# Patient Record
Sex: MALE | Race: ASIAN | Employment: OTHER | URBAN - METROPOLITAN AREA
[De-identification: names, ages, dates, MRNs, and addresses within clinical notes are randomized per-mention and may not be internally consistent; named-entity substitution may affect disease eponyms.]

---

## 2017-03-29 ENCOUNTER — OFFICE VISIT (OUTPATIENT)
Dept: INTERNAL MEDICINE CLINIC | Facility: CLINIC | Age: 62
End: 2017-03-29

## 2017-03-29 VITALS
DIASTOLIC BLOOD PRESSURE: 84 MMHG | WEIGHT: 144 LBS | TEMPERATURE: 98 F | BODY MASS INDEX: 25.84 KG/M2 | HEART RATE: 96 BPM | HEIGHT: 62.5 IN | RESPIRATION RATE: 18 BRPM | SYSTOLIC BLOOD PRESSURE: 128 MMHG

## 2017-03-29 DIAGNOSIS — E11.69 TYPE 2 DIABETES MELLITUS WITH OTHER SPECIFIED COMPLICATION, WITHOUT LONG-TERM CURRENT USE OF INSULIN (HCC): ICD-10-CM

## 2017-03-29 DIAGNOSIS — Z00.00 ROUTINE GENERAL MEDICAL EXAMINATION AT A HEALTH CARE FACILITY: Primary | ICD-10-CM

## 2017-03-29 DIAGNOSIS — Z12.5 SCREENING FOR PROSTATE CANCER: ICD-10-CM

## 2017-03-29 PROCEDURE — 99396 PREV VISIT EST AGE 40-64: CPT | Performed by: INTERNAL MEDICINE

## 2017-03-30 NOTE — PROGRESS NOTES
HPI:    Patient ID: Andrews Morin is a 59year old male.     HPI    Physical exam    Denies complaint  Last labs 2015      /84 mmHg  Pulse 96  Temp(Src) 98 °F (36.7 °C) (Oral)  Resp 18  Ht 5' 2.5\" (1.588 m)  Wt 144 lb (65.318 kg)  BMI 25.90 kg/m2 problem. Skin: Negative for rash. Neurological: Negative for syncope, weakness, light-headedness and headaches. Hematological: Negative for adenopathy. Does not bruise/bleed easily.    Psychiatric/Behavioral: Negative for behavioral problems and agita normal. Pupils are equal, round, and reactive to light. Right eye exhibits no discharge. Left eye exhibits no discharge. No scleral icterus. Neck: Neck supple. No thyromegaly present. Cardiovascular: Normal rate, regular rhythm and normal heart sounds. Differential  Comp Metabolic Panel (14)  Lipid Panel  Assay, Thyroid Stim Hormone  Free T4, (Free Thyroxine)  Hemoglobin A1C  Protein,Total,Urine, Random [E]  Urinalysis, Routine  PSA (Screening) [E]    Meds This Visit:  No prescriptions requested or order

## 2017-04-01 ENCOUNTER — APPOINTMENT (OUTPATIENT)
Dept: LAB | Age: 62
End: 2017-04-01
Attending: INTERNAL MEDICINE
Payer: COMMERCIAL

## 2017-04-01 DIAGNOSIS — Z00.00 ROUTINE GENERAL MEDICAL EXAMINATION AT A HEALTH CARE FACILITY: ICD-10-CM

## 2017-04-01 DIAGNOSIS — Z12.5 SCREENING FOR PROSTATE CANCER: ICD-10-CM

## 2017-04-01 PROCEDURE — 81001 URINALYSIS AUTO W/SCOPE: CPT

## 2017-04-01 PROCEDURE — 84443 ASSAY THYROID STIM HORMONE: CPT

## 2017-04-01 PROCEDURE — 85027 COMPLETE CBC AUTOMATED: CPT

## 2017-04-01 PROCEDURE — 36415 COLL VENOUS BLD VENIPUNCTURE: CPT

## 2017-04-01 PROCEDURE — 80061 LIPID PANEL: CPT

## 2017-04-01 PROCEDURE — 83036 HEMOGLOBIN GLYCOSYLATED A1C: CPT

## 2017-04-01 PROCEDURE — 84439 ASSAY OF FREE THYROXINE: CPT

## 2017-04-01 PROCEDURE — 80053 COMPREHEN METABOLIC PANEL: CPT

## 2017-04-01 PROCEDURE — 84156 ASSAY OF PROTEIN URINE: CPT

## 2017-04-04 ENCOUNTER — TELEPHONE (OUTPATIENT)
Dept: FAMILY MEDICINE CLINIC | Facility: CLINIC | Age: 62
End: 2017-04-04

## 2017-04-08 RX ORDER — LOSARTAN POTASSIUM 100 MG/1
TABLET ORAL
Qty: 90 TABLET | Refills: 1 | Status: SHIPPED | OUTPATIENT
Start: 2017-04-08 | End: 2017-10-28

## 2017-04-08 RX ORDER — ATORVASTATIN CALCIUM 20 MG/1
TABLET, FILM COATED ORAL
Qty: 90 TABLET | Refills: 1 | Status: SHIPPED | OUTPATIENT
Start: 2017-04-08 | End: 2017-12-01

## 2017-04-08 NOTE — TELEPHONE ENCOUNTER
I called pt's home, spoke to wife. Pt already spoke to nurse about results and has scheduled an appt.

## 2017-04-19 ENCOUNTER — OFFICE VISIT (OUTPATIENT)
Dept: INTERNAL MEDICINE CLINIC | Facility: CLINIC | Age: 62
End: 2017-04-19

## 2017-04-19 VITALS
SYSTOLIC BLOOD PRESSURE: 144 MMHG | WEIGHT: 143.63 LBS | HEART RATE: 91 BPM | DIASTOLIC BLOOD PRESSURE: 88 MMHG | HEIGHT: 62.5 IN | BODY MASS INDEX: 25.77 KG/M2

## 2017-04-19 DIAGNOSIS — E78.5 HYPERLIPIDEMIA, UNSPECIFIED HYPERLIPIDEMIA TYPE: ICD-10-CM

## 2017-04-19 DIAGNOSIS — I10 ESSENTIAL HYPERTENSION: ICD-10-CM

## 2017-04-19 DIAGNOSIS — E11.69 TYPE 2 DIABETES MELLITUS WITH OTHER SPECIFIED COMPLICATION (HCC): Primary | ICD-10-CM

## 2017-04-19 PROCEDURE — 99214 OFFICE O/P EST MOD 30 MIN: CPT | Performed by: INTERNAL MEDICINE

## 2017-04-19 PROCEDURE — 99212 OFFICE O/P EST SF 10 MIN: CPT | Performed by: INTERNAL MEDICINE

## 2017-05-01 NOTE — PROGRESS NOTES
HPI:    Patient ID: Jose Juan Martin is a 59year old male.     HPI    HTN  Long standing history of hypertension  More than a year kerry  Status::::: controlled:::::::::::::::::::::::::::::y   sympotms  :        Headache no  dizziness        no pressure and sore throat. Eyes: Negative for pain, discharge and redness. Respiratory: Negative for cough, chest tightness, shortness of breath and wheezing. Cardiovascular: Negative for chest pain, palpitations and leg swelling.    Gastrointestinal history      Social History:   Smoking Status: Never Smoker                      Smokeless Status: Never Used                        Alcohol Use: No                 Comment: (Beer) 3 beers occasionally       PHYSICAL EXAM:    Physical Exam   Constitutional BUN/CREA RATIO      10.0-20.0 22.3 (H)   CALCULATED OSMOLALITY      275-295 mOsm/kg 291   GFR, Non-      >=60 >60   GFR, -American      >=60 >60   URINE-COLOR      Yellow Yellow   APPEARANCE      Clear Clear   SPECIFIC GRAVITY TREATMENT PLAN AND GOAL DISCUSSED WITH PATEINT:  Hemoglobin A1C goal is 6.5 percent  Blood glucose monitoring  AM goal 80 to 120  Blood Pressure Goal  Is less than 130/80  Take  Cholesterol medication regularly at night  Eye exam with opthalmology every ye

## 2017-10-31 RX ORDER — LOSARTAN POTASSIUM 100 MG/1
TABLET ORAL
Qty: 90 TABLET | Refills: 1 | Status: SHIPPED | OUTPATIENT
Start: 2017-10-31 | End: 2018-04-09

## 2017-12-01 RX ORDER — ATORVASTATIN CALCIUM 20 MG/1
TABLET, FILM COATED ORAL
Qty: 90 TABLET | Refills: 1 | Status: SHIPPED | OUTPATIENT
Start: 2017-12-01 | End: 2018-05-21

## 2018-04-09 RX ORDER — LOSARTAN POTASSIUM 100 MG/1
TABLET ORAL
Qty: 90 TABLET | Refills: 0 | Status: SHIPPED | OUTPATIENT
Start: 2018-04-09 | End: 2018-05-21

## 2018-05-21 ENCOUNTER — OFFICE VISIT (OUTPATIENT)
Dept: INTERNAL MEDICINE CLINIC | Facility: CLINIC | Age: 63
End: 2018-05-21

## 2018-05-21 VITALS
BODY MASS INDEX: 26.81 KG/M2 | DIASTOLIC BLOOD PRESSURE: 93 MMHG | SYSTOLIC BLOOD PRESSURE: 161 MMHG | TEMPERATURE: 99 F | HEART RATE: 88 BPM | HEIGHT: 61 IN | WEIGHT: 142 LBS

## 2018-05-21 DIAGNOSIS — E78.5 HYPERLIPIDEMIA, UNSPECIFIED HYPERLIPIDEMIA TYPE: ICD-10-CM

## 2018-05-21 DIAGNOSIS — N20.0 KIDNEY STONES: ICD-10-CM

## 2018-05-21 DIAGNOSIS — Z12.11 SCREENING FOR COLON CANCER: ICD-10-CM

## 2018-05-21 DIAGNOSIS — E11.69 TYPE 2 DIABETES MELLITUS WITH OTHER SPECIFIED COMPLICATION, WITHOUT LONG-TERM CURRENT USE OF INSULIN (HCC): ICD-10-CM

## 2018-05-21 DIAGNOSIS — Z12.5 SCREENING FOR PROSTATE CANCER: ICD-10-CM

## 2018-05-21 DIAGNOSIS — Z00.00 ROUTINE GENERAL MEDICAL EXAMINATION AT A HEALTH CARE FACILITY: Primary | ICD-10-CM

## 2018-05-21 DIAGNOSIS — I70.0 ATHEROSCLEROSIS OF ABDOMINAL AORTA (HCC): ICD-10-CM

## 2018-05-21 DIAGNOSIS — I10 ESSENTIAL HYPERTENSION: ICD-10-CM

## 2018-05-21 PROCEDURE — 99396 PREV VISIT EST AGE 40-64: CPT | Performed by: INTERNAL MEDICINE

## 2018-05-21 RX ORDER — ATORVASTATIN CALCIUM 20 MG/1
TABLET, FILM COATED ORAL
Qty: 90 TABLET | Refills: 1 | Status: SHIPPED | OUTPATIENT
Start: 2018-05-21 | End: 2020-01-22

## 2018-05-21 RX ORDER — LOSARTAN POTASSIUM 100 MG/1
TABLET ORAL
Qty: 90 TABLET | Refills: 1 | Status: SHIPPED | OUTPATIENT
Start: 2018-05-21 | End: 2020-01-22

## 2018-05-21 NOTE — PROGRESS NOTES
HPI:    Patient ID: Veronica Abbott is a 57year old male.     HPI    Physical exam      HTN  Long standing history of hypertension     sympotms  :        Headache no  dizziness        no                             Blurred vision no  palpitaionsSyncope no DAILY Disp: 90 tablet Rfl: 0   METFORMIN  MG Oral Tab TAKE ONE TABLET BY MOUTH IN THE MORNING AND ONE IN THE EVENING WITH MEALS Disp: 60 tablet Rfl: 3   ATORVASTATIN 20 MG Oral Tab TAKE ONE TABLET BY MOUTH ONCE DAILY Disp: 90 tablet Rfl: 1   aspirin He has no wheezes. He has no rales. He exhibits no tenderness. Abdominal: Soft. Bowel sounds are normal. He exhibits no distension and no mass. There is no tenderness. Musculoskeletal: He exhibits no edema or tenderness.    Lymphadenopathy:     He has n testing reviewed. Dietary and lifestyle change discussed. Modification of risk for CAD discussed. Patient voiced understanding and agrees with current plan and management.   \      Meds This Visit:  Pending Prescriptions Disp Refills    atorvastatin 20 M

## 2018-05-24 ENCOUNTER — TELEPHONE (OUTPATIENT)
Dept: INTERNAL MEDICINE CLINIC | Facility: CLINIC | Age: 63
End: 2018-05-24

## 2018-05-24 ENCOUNTER — APPOINTMENT (OUTPATIENT)
Dept: LAB | Age: 63
End: 2018-05-24
Attending: INTERNAL MEDICINE
Payer: COMMERCIAL

## 2018-05-24 DIAGNOSIS — Z00.00 ROUTINE GENERAL MEDICAL EXAMINATION AT A HEALTH CARE FACILITY: ICD-10-CM

## 2018-05-24 DIAGNOSIS — Z12.5 SCREENING FOR PROSTATE CANCER: ICD-10-CM

## 2018-05-24 DIAGNOSIS — Z12.11 SCREENING FOR COLON CANCER: ICD-10-CM

## 2018-05-24 PROCEDURE — 84439 ASSAY OF FREE THYROXINE: CPT

## 2018-05-24 PROCEDURE — 36415 COLL VENOUS BLD VENIPUNCTURE: CPT

## 2018-05-24 PROCEDURE — 81015 MICROSCOPIC EXAM OF URINE: CPT

## 2018-05-24 PROCEDURE — 80053 COMPREHEN METABOLIC PANEL: CPT

## 2018-05-24 PROCEDURE — 83036 HEMOGLOBIN GLYCOSYLATED A1C: CPT

## 2018-05-24 PROCEDURE — 84443 ASSAY THYROID STIM HORMONE: CPT

## 2018-05-24 PROCEDURE — 80061 LIPID PANEL: CPT

## 2018-05-24 PROCEDURE — 82274 ASSAY TEST FOR BLOOD FECAL: CPT

## 2018-05-24 PROCEDURE — 85027 COMPLETE CBC AUTOMATED: CPT

## 2018-05-24 PROCEDURE — 84156 ASSAY OF PROTEIN URINE: CPT

## 2018-05-24 NOTE — TELEPHONE ENCOUNTER
PSA elevated 4.2   Follow up with urology  Referral [facility] an appt with DR Mik Tracy [phone]   Other labs are normal

## 2018-05-25 NOTE — TELEPHONE ENCOUNTER
Spoke with patient's spouse, on hippa (verified name and ), reviewed information, she verbalized understanding and agrees with plan. Phone number for Dr. Shin [place] office was given.

## 2018-07-10 ENCOUNTER — NURSE TRIAGE (OUTPATIENT)
Dept: OTHER | Age: 63
End: 2018-07-10

## 2018-07-10 NOTE — TELEPHONE ENCOUNTER
Please reply to pool: EM RN TRIAGE  Action Requested: Summary for Provider     []  Critical Lab, Recommendations Needed  [x] Need Additional Advice  []   FYI    [x]   Need Orders  [] Need Medications Sent to Pharmacy  []  Other     SUMMARY: Advised to go t

## 2018-07-10 NOTE — TELEPHONE ENCOUNTER
I am fully booked  My medicare annual   2 of them bet 1 to 2 pm   Are not here yet they went to the wrong office  Elderly form Juan GARVEY approval taken already

## 2018-07-10 NOTE — TELEPHONE ENCOUNTER
Monitor Add SDS this Thursday if needed  If any other symptoms dizziness  More bruising   follouwp    Note pt playing tennis    If short of breath  Dizzy  Chest pain syncope    ER  And let me know

## 2018-07-12 NOTE — TELEPHONE ENCOUNTER
Scheduled appt with Dr Matthew Francis 7/16/18 @ 1:00pm [place] office for evaluation of left foot bruising. Wife John Masters stated pt is at work. No sob, chest pain, dizziness, bruising is slowing dissapating.

## 2018-08-07 ENCOUNTER — OFFICE VISIT (OUTPATIENT)
Dept: SURGERY | Facility: CLINIC | Age: 63
End: 2018-08-07
Payer: MEDICARE

## 2018-08-07 ENCOUNTER — TELEPHONE (OUTPATIENT)
Dept: SURGERY | Facility: CLINIC | Age: 63
End: 2018-08-07

## 2018-08-07 ENCOUNTER — LAB ENCOUNTER (OUTPATIENT)
Dept: LAB | Facility: HOSPITAL | Age: 63
End: 2018-08-07
Attending: UROLOGY
Payer: MEDICARE

## 2018-08-07 VITALS
WEIGHT: 146 LBS | BODY MASS INDEX: 26.87 KG/M2 | DIASTOLIC BLOOD PRESSURE: 80 MMHG | HEART RATE: 80 BPM | SYSTOLIC BLOOD PRESSURE: 142 MMHG | HEIGHT: 62 IN | RESPIRATION RATE: 16 BRPM | TEMPERATURE: 99 F

## 2018-08-07 DIAGNOSIS — R97.20 ELEVATED PSA, LESS THAN 10 NG/ML: Primary | ICD-10-CM

## 2018-08-07 DIAGNOSIS — R97.20 ELEVATED PSA, LESS THAN 10 NG/ML: ICD-10-CM

## 2018-08-07 LAB
PSA FREE MFR SERPL: 21 %
PSA FREE SERPL-MCNC: 0.64 NG/ML
PSA SERPL-MCNC: 3 NG/ML (ref 0–4)

## 2018-08-07 PROCEDURE — 36415 COLL VENOUS BLD VENIPUNCTURE: CPT

## 2018-08-07 PROCEDURE — 84154 ASSAY OF PSA FREE: CPT

## 2018-08-07 PROCEDURE — 84153 ASSAY OF PSA TOTAL: CPT

## 2018-08-07 PROCEDURE — G0463 HOSPITAL OUTPT CLINIC VISIT: HCPCS | Performed by: UROLOGY

## 2018-08-07 PROCEDURE — 99204 OFFICE O/P NEW MOD 45 MIN: CPT | Performed by: UROLOGY

## 2018-08-07 NOTE — TELEPHONE ENCOUNTER
Called to discuss PSA results from today. Level down to 3 ng/dL today from 4.2 two months ago. Discussed that this is reassuring and we would forego him having a biopsy. He will return to see me in 6 months with another PSA level.

## 2018-08-07 NOTE — PROGRESS NOTES
[facility], [facility] Urology  Initial Office Consultation    HPI:   Brenda Jauregui is a 70year old male here today for evaluation of an elevated screening PSA of 4.2 ng/mL. The test was ordered by his PCP.   He was referred to urology after the results came ba Allergies: Chicken; Shrimp    REVIEW OF SYSTEMS:  Review of Systems   Constitutional: Negative for appetite change, chills, fatigue, fever and unexpected weight change. HENT: Negative for hearing loss and sore throat.     Eyes: Negative for pain an 4/17/2015   PSA      0.0 - 4.0 ng/mL 3.0 4.2 (H) 2.6 2.9   FREE PSA      ng/mL       % FREE PSA      % 21      PSA, FREE      ng/mL 0.640        Component      Latest Ref Rng & Units 6/29/2013 4/5/2011   PSA      0.0 - 4.0 ng/mL 2.3 1.8   FREE PSA      ng/ lower than 4.2, or normal then I would forego undergoing a biopsy and just have him return in 6 months with another PSA level. He asked appropriate questions, all of which were answered to his satisfaction. PLAN:  - Repeat PSA level obtained today.

## 2018-08-09 ENCOUNTER — TELEPHONE (OUTPATIENT)
Dept: CASE MANAGEMENT | Age: 63
End: 2018-08-09

## 2018-10-01 ENCOUNTER — TELEPHONE (OUTPATIENT)
Dept: CASE MANAGEMENT | Age: 63
End: 2018-10-01

## 2018-10-03 ENCOUNTER — TELEPHONE (OUTPATIENT)
Dept: INTERNAL MEDICINE CLINIC | Facility: CLINIC | Age: 63
End: 2018-10-03

## 2018-10-03 NOTE — TELEPHONE ENCOUNTER
Tiffani/Dr. Shaheed Chen's office called in stating that they have attempted to schedule with Pt multiple times without success. Please be aware.

## 2018-11-12 ENCOUNTER — TELEPHONE (OUTPATIENT)
Dept: CASE MANAGEMENT | Age: 63
End: 2018-11-12

## 2019-03-14 ENCOUNTER — TELEPHONE (OUTPATIENT)
Dept: SURGERY | Facility: CLINIC | Age: 64
End: 2019-03-14

## 2019-06-24 ENCOUNTER — TELEPHONE (OUTPATIENT)
Dept: CASE MANAGEMENT | Age: 64
End: 2019-06-24

## 2019-08-09 ENCOUNTER — TELEPHONE (OUTPATIENT)
Dept: CASE MANAGEMENT | Age: 64
End: 2019-08-09

## 2019-09-27 ENCOUNTER — TELEPHONE (OUTPATIENT)
Dept: CASE MANAGEMENT | Age: 64
End: 2019-09-27

## 2020-01-06 ENCOUNTER — TELEPHONE (OUTPATIENT)
Dept: INTERNAL MEDICINE CLINIC | Facility: CLINIC | Age: 65
End: 2020-01-06

## 2020-01-22 ENCOUNTER — OFFICE VISIT (OUTPATIENT)
Dept: INTERNAL MEDICINE CLINIC | Facility: CLINIC | Age: 65
End: 2020-01-22
Payer: MEDICARE

## 2020-01-22 VITALS
BODY MASS INDEX: 24.99 KG/M2 | SYSTOLIC BLOOD PRESSURE: 138 MMHG | TEMPERATURE: 98 F | HEIGHT: 62 IN | HEART RATE: 106 BPM | DIASTOLIC BLOOD PRESSURE: 88 MMHG | WEIGHT: 135.81 LBS

## 2020-01-22 DIAGNOSIS — E11.69 TYPE 2 DIABETES MELLITUS WITH OTHER SPECIFIED COMPLICATION, WITHOUT LONG-TERM CURRENT USE OF INSULIN (HCC): ICD-10-CM

## 2020-01-22 DIAGNOSIS — E78.5 HYPERLIPIDEMIA, UNSPECIFIED HYPERLIPIDEMIA TYPE: ICD-10-CM

## 2020-01-22 DIAGNOSIS — I10 ESSENTIAL HYPERTENSION: ICD-10-CM

## 2020-01-22 DIAGNOSIS — Z12.11 SCREENING FOR COLON CANCER: ICD-10-CM

## 2020-01-22 DIAGNOSIS — Z00.00 ENCOUNTER FOR ANNUAL HEALTH EXAMINATION: ICD-10-CM

## 2020-01-22 DIAGNOSIS — Z00.00 ROUTINE GENERAL MEDICAL EXAMINATION AT A HEALTH CARE FACILITY: Primary | ICD-10-CM

## 2020-01-22 DIAGNOSIS — I70.0 ATHEROSCLEROSIS OF ABDOMINAL AORTA (HCC): ICD-10-CM

## 2020-01-22 DIAGNOSIS — Z12.5 SCREENING FOR PROSTATE CANCER: ICD-10-CM

## 2020-01-22 PROCEDURE — 96160 PT-FOCUSED HLTH RISK ASSMT: CPT | Performed by: INTERNAL MEDICINE

## 2020-01-22 PROCEDURE — 90662 IIV NO PRSV INCREASED AG IM: CPT | Performed by: INTERNAL MEDICINE

## 2020-01-22 PROCEDURE — 99397 PER PM REEVAL EST PAT 65+ YR: CPT | Performed by: INTERNAL MEDICINE

## 2020-01-22 PROCEDURE — G0438 PPPS, INITIAL VISIT: HCPCS | Performed by: INTERNAL MEDICINE

## 2020-01-22 PROCEDURE — G0009 ADMIN PNEUMOCOCCAL VACCINE: HCPCS | Performed by: INTERNAL MEDICINE

## 2020-01-22 PROCEDURE — 90732 PPSV23 VACC 2 YRS+ SUBQ/IM: CPT | Performed by: INTERNAL MEDICINE

## 2020-01-22 PROCEDURE — G0008 ADMIN INFLUENZA VIRUS VAC: HCPCS | Performed by: INTERNAL MEDICINE

## 2020-01-22 RX ORDER — ATORVASTATIN CALCIUM 20 MG/1
TABLET, FILM COATED ORAL
Qty: 90 TABLET | Refills: 3 | Status: SHIPPED | OUTPATIENT
Start: 2020-01-22 | End: 2020-11-04

## 2020-01-22 RX ORDER — LANCETS 28 GAUGE
EACH MISCELLANEOUS
Qty: 100 EACH | Refills: 3 | Status: SHIPPED | OUTPATIENT
Start: 2020-01-22 | End: 2021-03-08

## 2020-01-22 RX ORDER — LOSARTAN POTASSIUM 100 MG/1
TABLET ORAL
Qty: 90 TABLET | Refills: 1 | Status: SHIPPED | OUTPATIENT
Start: 2020-01-22 | End: 2020-06-26

## 2020-01-22 RX ORDER — BLOOD-GLUCOSE METER
KIT MISCELLANEOUS
Qty: 1 DEVICE | Refills: 0 | Status: SHIPPED | OUTPATIENT
Start: 2020-01-22

## 2020-01-22 RX ORDER — ZOLPIDEM TARTRATE 5 MG/1
5 TABLET ORAL NIGHTLY PRN
Qty: 30 TABLET | Refills: 0 | Status: SHIPPED | OUTPATIENT
Start: 2020-01-22 | End: 2020-04-01

## 2020-01-22 RX ORDER — BLOOD-GLUCOSE METER
KIT MISCELLANEOUS
Qty: 100 STRIP | Refills: 3 | Status: SHIPPED | OUTPATIENT
Start: 2020-01-22 | End: 2021-03-08

## 2020-01-24 ENCOUNTER — APPOINTMENT (OUTPATIENT)
Dept: LAB | Age: 65
End: 2020-01-24
Attending: INTERNAL MEDICINE
Payer: MEDICARE

## 2020-01-24 DIAGNOSIS — Z12.11 SCREENING FOR COLON CANCER: ICD-10-CM

## 2020-01-24 DIAGNOSIS — E11.69 TYPE 2 DIABETES MELLITUS WITH OTHER SPECIFIED COMPLICATION, WITHOUT LONG-TERM CURRENT USE OF INSULIN (HCC): ICD-10-CM

## 2020-01-24 DIAGNOSIS — E78.5 HYPERLIPIDEMIA, UNSPECIFIED HYPERLIPIDEMIA TYPE: ICD-10-CM

## 2020-01-24 LAB
ALBUMIN SERPL-MCNC: 3.8 G/DL (ref 3.4–5)
ALBUMIN/GLOB SERPL: 1 {RATIO} (ref 1–2)
ALP LIVER SERPL-CCNC: 64 U/L (ref 45–117)
ALT SERPL-CCNC: 24 U/L (ref 16–61)
ANION GAP SERPL CALC-SCNC: 4 MMOL/L (ref 0–18)
AST SERPL-CCNC: 13 U/L (ref 15–37)
BACTERIA UR QL AUTO: NEGATIVE /HPF
BILIRUB SERPL-MCNC: 0.6 MG/DL (ref 0.1–2)
BUN BLD-MCNC: 14 MG/DL (ref 7–18)
BUN/CREAT SERPL: 13.2 (ref 10–20)
CALCIUM BLD-MCNC: 9.8 MG/DL (ref 8.5–10.1)
CHLORIDE SERPL-SCNC: 100 MMOL/L (ref 98–112)
CHOLEST SMN-MCNC: 151 MG/DL (ref ?–200)
CO2 SERPL-SCNC: 31 MMOL/L (ref 21–32)
COMPLEXED PSA SERPL-MCNC: 2.28 NG/ML (ref ?–4)
CREAT BLD-MCNC: 1.06 MG/DL (ref 0.7–1.3)
DEPRECATED RDW RBC AUTO: 40.5 FL (ref 35.1–46.3)
ERYTHROCYTE [DISTWIDTH] IN BLOOD BY AUTOMATED COUNT: 12.8 % (ref 11–15)
EST. AVERAGE GLUCOSE BLD GHB EST-MCNC: 364 MG/DL (ref 68–126)
GLOBULIN PLAS-MCNC: 3.7 G/DL (ref 2.8–4.4)
GLUCOSE BLD-MCNC: 229 MG/DL (ref 70–99)
HBA1C MFR BLD HPLC: 14.3 % (ref ?–5.7)
HCT VFR BLD AUTO: 48.9 % (ref 39–53)
HDLC SERPL-MCNC: 46 MG/DL (ref 40–59)
HGB BLD-MCNC: 16.4 G/DL (ref 13–17.5)
LDLC SERPL CALC-MCNC: 89 MG/DL (ref ?–100)
M PROTEIN MFR SERPL ELPH: 7.5 G/DL (ref 6.4–8.2)
MCH RBC QN AUTO: 29.2 PG (ref 26–34)
MCHC RBC AUTO-ENTMCNC: 33.5 G/DL (ref 31–37)
MCV RBC AUTO: 87.2 FL (ref 80–100)
NONHDLC SERPL-MCNC: 105 MG/DL (ref ?–130)
OSMOLALITY SERPL CALC.SUM OF ELEC: 288 MOSM/KG (ref 275–295)
PATIENT FASTING Y/N/NP: YES
PATIENT FASTING Y/N/NP: YES
PLATELET # BLD AUTO: 157 10(3)UL (ref 150–450)
POTASSIUM SERPL-SCNC: 4.3 MMOL/L (ref 3.5–5.1)
PROT UR-MCNC: 29.9 MG/DL
RBC # BLD AUTO: 5.61 X10(6)UL (ref 3.8–5.8)
RBC #/AREA URNS AUTO: <1 /HPF
SODIUM SERPL-SCNC: 135 MMOL/L (ref 136–145)
T4 FREE SERPL-MCNC: 1.2 NG/DL (ref 0.8–1.7)
TRIGL SERPL-MCNC: 81 MG/DL (ref 30–149)
TSI SER-ACNC: 0.3 MIU/ML (ref 0.36–3.74)
VLDLC SERPL CALC-MCNC: 16 MG/DL (ref 0–30)
WBC # BLD AUTO: 7.4 X10(3) UL (ref 4–11)
WBC #/AREA URNS AUTO: <1 /HPF

## 2020-01-24 PROCEDURE — 84443 ASSAY THYROID STIM HORMONE: CPT

## 2020-01-24 PROCEDURE — 80053 COMPREHEN METABOLIC PANEL: CPT

## 2020-01-24 PROCEDURE — 36415 COLL VENOUS BLD VENIPUNCTURE: CPT

## 2020-01-24 PROCEDURE — 81015 MICROSCOPIC EXAM OF URINE: CPT

## 2020-01-24 PROCEDURE — 85027 COMPLETE CBC AUTOMATED: CPT

## 2020-01-24 PROCEDURE — 80061 LIPID PANEL: CPT

## 2020-01-24 PROCEDURE — 84156 ASSAY OF PROTEIN URINE: CPT

## 2020-01-24 PROCEDURE — 84439 ASSAY OF FREE THYROXINE: CPT

## 2020-01-24 PROCEDURE — 83036 HEMOGLOBIN GLYCOSYLATED A1C: CPT

## 2020-02-03 NOTE — PROGRESS NOTES
HPI:   Arabella Antoine is a 75year old male who presents for a Medicare Subsequent Annual Wellness visit (Pt already had Initial Annual Wellness).       His last annual assessment has been over 1 year: Annual Physical due on 05/21/2019         Fall/Risk As Chemistry Labs:   Lab Results   Component Value Date    AST 13 (L) 01/24/2020    ALT 24 01/24/2020    CA 9.8 01/24/2020    ALB 3.8 01/24/2020    TSH 0.300 (L) 01/24/2020    CREATSERUM 1.06 01/24/2020     (H) 01/24/2020        CBC  (most recent labs) Questionnaire  I have a problem hearing over the telephone:  No I have trouble following the conversations when two or more people are talking at the same time:  No   I have trouble understanding things on the TV:  No I have to strain to understand convers Regular rhythm, normal heart sounds and intact distal pulses. No murmur heard. Edema not present. Pulmonary/Chest: Effort normal and breath sounds normal. No respiratory distress. He has no wheezes. He has no rales.    Abdominal: Bowel sounds are norm REFLEX CULTURE,         OPHTHALMOLOGY - EXTERNAL        COMLIANCE  WITH FOLLOW UP ADVISED  EYE EXAM YEARLY    (I70.0) Atherosclerosis of abdominal aorta (HCC)  Plan: ASYMPTOMATIC    (Z12.11) Screening for colon cancer  Plan: PSA SCREEN, OP REFERRAL TO [facility] Physical Exam only, or if medically necessary Electrocardiogram date    Colorectal Cancer Screening      Colonoscopy Screen every 10 years Colonoscopy due on 07/22/2003 Update Health Maintenance if applicable    Flex Sigmoidoscopy Screen every 10 years No Potassium  Annually Potassium (mmol/L)   Date Value   01/24/2020 4.3     POTASSIUM (P) (mmol/L)   Date Value   04/17/2015 4.5    No flowsheet data found. Creatinine  Annually Creatinine (mg/dL)   Date Value   01/24/2020 1.06    No flowsheet data found.

## 2020-02-03 NOTE — PATIENT INSTRUCTIONS
Howard Schmid's SCREENING SCHEDULE   Tests on this list are recommended by your physician but may not be covered, or covered at this frequency, by your insurer. Please check with your insurance carrier before scheduling to verify coverage.     MARILYNN more often if abnormal Colonoscopy due on 07/22/2003 Update Health Maintenance if applicable    Flex Sigmoidoscopy Screen  Covered every 5 years No results found for this or any previous visit. No flowsheet data found.      Fecal Occult Blood   Covered Iesha Not covered by Medicare Part B) No orders found for this or any previous visit.  This may be covered with your prescription benefits, but Medicare does not cover unless Medically needed    Zoster (Not covered by Medicare Part B) No orders found for this or

## 2020-04-01 RX ORDER — ZOLPIDEM TARTRATE 5 MG/1
TABLET ORAL
Qty: 30 TABLET | Refills: 0 | Status: SHIPPED | OUTPATIENT
Start: 2020-04-01 | End: 2020-05-26

## 2020-05-06 ENCOUNTER — TELEPHONE (OUTPATIENT)
Dept: INTERNAL MEDICINE CLINIC | Facility: CLINIC | Age: 65
End: 2020-05-06

## 2020-05-06 NOTE — TELEPHONE ENCOUNTER
Current Outpatient Medications:   •  metFORMIN HCl  MG Oral Tablet 24 Hr, Take 2 tablets (1,000 mg total) by mouth 2 (two) times daily with meals. , Disp: 360 tablet, Rfl: 3

## 2020-05-26 ENCOUNTER — TELEPHONE (OUTPATIENT)
Dept: INTERNAL MEDICINE CLINIC | Facility: CLINIC | Age: 65
End: 2020-05-26

## 2020-06-24 ENCOUNTER — TELEPHONE (OUTPATIENT)
Dept: INTERNAL MEDICINE CLINIC | Facility: CLINIC | Age: 65
End: 2020-06-24

## 2020-06-24 NOTE — TELEPHONE ENCOUNTER
Patient's wife, Timothy De La Cruz, called, because she was advised that patient's prescription for metformin has been recalled. Please advise.     metFORMIN HCl  MG Oral Tablet 24 Hr

## 2020-06-25 NOTE — TELEPHONE ENCOUNTER
Spoke to Pt will contact local pharmacy around his area and see if same lot #'s for Metformin are recalled . Per Pt believes all Metformin has been recalled as they have same  .  Pt will contact our office once obtains a pharmacy that does not h

## 2020-06-26 RX ORDER — LOSARTAN POTASSIUM 100 MG/1
TABLET ORAL
Qty: 90 TABLET | Refills: 3 | Status: SHIPPED | OUTPATIENT
Start: 2020-06-26 | End: 2020-11-04

## 2020-08-04 RX ORDER — ZOLPIDEM TARTRATE 5 MG/1
5 TABLET ORAL NIGHTLY PRN
Qty: 30 TABLET | Refills: 0 | Status: SHIPPED | OUTPATIENT
Start: 2020-08-04 | End: 2020-08-08

## 2020-08-08 RX ORDER — ZOLPIDEM TARTRATE 5 MG/1
TABLET ORAL
Qty: 30 TABLET | Refills: 0 | Status: SHIPPED
Start: 2020-08-08 | End: 2020-10-10

## 2020-08-14 ENCOUNTER — TELEPHONE (OUTPATIENT)
Dept: INTERNAL MEDICINE CLINIC | Facility: CLINIC | Age: 65
End: 2020-08-14

## 2020-08-14 RX ORDER — ZOLPIDEM TARTRATE 5 MG/1
5 TABLET ORAL NIGHTLY PRN
Qty: 30 TABLET | Refills: 1 | Status: CANCELLED | OUTPATIENT
Start: 2020-08-14

## 2020-08-14 NOTE — TELEPHONE ENCOUNTER
Ahmeek/ Pharmacy Technician of [facility] is requesting a script for patient's medication ZOLPIDEM 5 MG Oral Tab. April Dinero states patient would like for this medication to be sent to [facility].

## 2020-08-14 NOTE — TELEPHONE ENCOUNTER
Routed to Dr Nishant Hook for advise, thanks. Requested Prescriptions     Pending Prescriptions Disp Refills   • zolpidem 5 MG Oral Tab 30 tablet 1     Sig: Take 1 tablet (5 mg total) by mouth nightly as needed for Sleep.        Last Office Visit with PCP: 1/

## 2020-08-26 ENCOUNTER — TELEPHONE (OUTPATIENT)
Dept: INTERNAL MEDICINE CLINIC | Facility: CLINIC | Age: 65
End: 2020-08-26

## 2020-08-26 ENCOUNTER — NURSE TRIAGE (OUTPATIENT)
Dept: INTERNAL MEDICINE CLINIC | Facility: CLINIC | Age: 65
End: 2020-08-26

## 2020-08-26 ENCOUNTER — HOSPITAL ENCOUNTER (OUTPATIENT)
Dept: CT IMAGING | Facility: HOSPITAL | Age: 65
Discharge: HOME | End: 2020-08-26
Attending: INTERNAL MEDICINE
Payer: MEDICARE

## 2020-08-26 ENCOUNTER — OFFICE VISIT (OUTPATIENT)
Dept: INTERNAL MEDICINE CLINIC | Facility: CLINIC | Age: 65
End: 2020-08-26
Payer: MEDICARE

## 2020-08-26 VITALS
HEART RATE: 111 BPM | DIASTOLIC BLOOD PRESSURE: 79 MMHG | SYSTOLIC BLOOD PRESSURE: 182 MMHG | TEMPERATURE: 98 F | WEIGHT: 146 LBS | BODY MASS INDEX: 26.87 KG/M2 | HEIGHT: 62 IN

## 2020-08-26 DIAGNOSIS — R31.9 HEMATURIA, UNSPECIFIED TYPE: ICD-10-CM

## 2020-08-26 DIAGNOSIS — R10.31 RLQ ABDOMINAL PAIN: ICD-10-CM

## 2020-08-26 DIAGNOSIS — R31.9 HEMATURIA, UNSPECIFIED TYPE: Primary | ICD-10-CM

## 2020-08-26 LAB
MULTISTIX LOT#: NORMAL NUMERIC
PH, URINE: 5 (ref 4.5–8)
SPECIFIC GRAVITY: 1.01 (ref 1–1.03)
UROBILINOGEN,SEMI-QN: 0.2 MG/DL (ref 0–1.9)

## 2020-08-26 PROCEDURE — 99214 OFFICE O/P EST MOD 30 MIN: CPT | Performed by: INTERNAL MEDICINE

## 2020-08-26 PROCEDURE — 3077F SYST BP >= 140 MM HG: CPT | Performed by: INTERNAL MEDICINE

## 2020-08-26 PROCEDURE — 3078F DIAST BP <80 MM HG: CPT | Performed by: INTERNAL MEDICINE

## 2020-08-26 PROCEDURE — 81002 URINALYSIS NONAUTO W/O SCOPE: CPT | Performed by: INTERNAL MEDICINE

## 2020-08-26 PROCEDURE — 74176 CT ABD & PELVIS W/O CONTRAST: CPT | Performed by: INTERNAL MEDICINE

## 2020-08-26 PROCEDURE — 3008F BODY MASS INDEX DOCD: CPT | Performed by: INTERNAL MEDICINE

## 2020-08-26 RX ORDER — TRAMADOL HYDROCHLORIDE 50 MG/1
50 TABLET ORAL EVERY 8 HOURS PRN
Qty: 30 TABLET | Refills: 1 | Status: SHIPPED | OUTPATIENT
Start: 2020-08-26

## 2020-08-26 NOTE — TELEPHONE ENCOUNTER
I checked back with pt to see if symptoms were worse (would send to ER). Wife states that the Advil has helped, although the pain does come and on. Advised appt with another provider. appt made with Dr. Bryanna Clark at 10:45.

## 2020-08-26 NOTE — TELEPHONE ENCOUNTER
Contacted patient's insurance [facility] to obtain PA for STAT CT ABDOMEN + PELVIS KIDNEYSTONE 2D RNDR (NO IV, NO ORAL) (CPT 57540). Prior Authorization required through [place]:   Phone: [phone]    Spoke to Eden Pruitt.  Provided her with patient in

## 2020-08-26 NOTE — PROGRESS NOTES
Patient ID: Sixto Bergman is a 77year old male. Patient presents with:  Abdominal Pain: Right side abdominal pain   Hematuria: Noticed hematuria in urine yesterday, and some today. HISTORY OF PRESENT ILLNESS:   HPI  Patient presents for above. Pain., Disp: 30 tablet, Rfl: 1  •  ZOLPIDEM 5 MG Oral Tab, TAKE 1 TABLET BY MOUTH NIGHTLY AS NEEDED FOR SLEEP, Disp: 30 tablet, Rfl: 0  •  losartan 100 MG Oral Tab, TAKE 1 TABLET DAILY (OFFICE VISIT REQUIRED PRIOR TO ANY ADDITIONAL REFILLS), Disp: 90 table Caodaism service: Not on file        Active member of club or organization: Not on file        Attends meetings of clubs or organizations: Not on file        Relationship status: Not on file      Intimate partner violence:        Fear of current or ex par Ref Range: Negative  Small   Ketones, UA Latest Ref Range: Negative mg/dL Neg   Blood Urine Latest Ref Range: Negative  Large   PH Urine Latest Ref Range: 4.5 - 8.0  5.0   Protein Urine Latest Ref Range: Negative/Trace mg/dL Small   Urobilinogen Urine Late

## 2020-08-26 NOTE — TELEPHONE ENCOUNTER
Action Requested: Summary for Provider     []  Critical Lab, Recommendations Needed  [] Need Additional Advice  []   FYI    []   Need Orders  [] Need Medications Sent to Pharmacy  []  Other     SUMMARY: pt's wife states that pt had hematuria yesterday angélica

## 2020-08-26 NOTE — PATIENT INSTRUCTIONS
Blood in Urine (Hematuria)   Blood in your urine is called hematuria. Most of the time, the cause is not serious. But you should never ignore blood in the urine.  Your healthcare provider can evaluate you to find the cause of the bleeding and treat it, if · Ultrasound of the kidney  · Kidney biopsy  Sima last reviewed this educational content on 6/1/2019  © 6551-6849 [employer]. [address] [facility], [address] [place] [address]. All rights reserved.  This information is not intended as a substitut · Each time you urinate, do so in a jar. Pour the urine from the jar through the strainer and into the toilet. Continue doing this until 24 hours after your pain stops. By then, if there was a kidney stone, it should pass from your bladder.  Some stones dis · Eat foods that contain phytates. These include wheat, rice, rye, barley, and beans. Phytates are substances that may lower your risk for any type of stone to form. · Eat more fruits and vegetables. Choose those that are high in potassium.   · Eat foods h · A dietitian or your healthcare provider can give you information about changes in your diet that will help prevent more kidney stones from forming.   Follow-up care  Follow up with your healthcare provider, or as advised, if the pain lasts more than 48 ho

## 2020-08-27 ENCOUNTER — MED REC SCAN ONLY (OUTPATIENT)
Dept: INTERNAL MEDICINE CLINIC | Facility: CLINIC | Age: 65
End: 2020-08-27

## 2020-10-10 RX ORDER — ZOLPIDEM TARTRATE 5 MG/1
TABLET ORAL
Qty: 30 TABLET | Refills: 0 | Status: SHIPPED | OUTPATIENT
Start: 2020-10-10 | End: 2020-11-04

## 2020-10-11 RX ORDER — ZOLPIDEM TARTRATE 5 MG/1
5 TABLET ORAL NIGHTLY PRN
Qty: 30 TABLET | Refills: 0 | OUTPATIENT
Start: 2020-10-11

## 2020-11-02 ENCOUNTER — TELEPHONE (OUTPATIENT)
Dept: INTERNAL MEDICINE CLINIC | Facility: CLINIC | Age: 65
End: 2020-11-02

## 2020-11-02 NOTE — TELEPHONE ENCOUNTER
Spouse, states that the patient has an appointment with Dr. Lopez Thakkar on Wednesday 11-4-20 and would like to do his blood work prior to his appointment. Please, call pt when the orders are in the system.

## 2020-11-04 ENCOUNTER — OFFICE VISIT (OUTPATIENT)
Dept: INTERNAL MEDICINE CLINIC | Facility: CLINIC | Age: 65
End: 2020-11-04
Payer: MEDICARE

## 2020-11-04 VITALS
DIASTOLIC BLOOD PRESSURE: 84 MMHG | SYSTOLIC BLOOD PRESSURE: 130 MMHG | HEART RATE: 108 BPM | WEIGHT: 145 LBS | HEIGHT: 62 IN | TEMPERATURE: 98 F | BODY MASS INDEX: 26.68 KG/M2

## 2020-11-04 DIAGNOSIS — E78.5 HYPERLIPIDEMIA, UNSPECIFIED HYPERLIPIDEMIA TYPE: ICD-10-CM

## 2020-11-04 DIAGNOSIS — I10 ESSENTIAL HYPERTENSION: ICD-10-CM

## 2020-11-04 DIAGNOSIS — Z12.11 COLON CANCER SCREENING: ICD-10-CM

## 2020-11-04 DIAGNOSIS — E11.69 TYPE 2 DIABETES MELLITUS WITH OTHER SPECIFIED COMPLICATION, WITHOUT LONG-TERM CURRENT USE OF INSULIN (HCC): Primary | ICD-10-CM

## 2020-11-04 PROCEDURE — G0008 ADMIN INFLUENZA VIRUS VAC: HCPCS | Performed by: INTERNAL MEDICINE

## 2020-11-04 PROCEDURE — 3075F SYST BP GE 130 - 139MM HG: CPT | Performed by: INTERNAL MEDICINE

## 2020-11-04 PROCEDURE — 3079F DIAST BP 80-89 MM HG: CPT | Performed by: INTERNAL MEDICINE

## 2020-11-04 PROCEDURE — 90662 IIV NO PRSV INCREASED AG IM: CPT | Performed by: INTERNAL MEDICINE

## 2020-11-04 PROCEDURE — 3008F BODY MASS INDEX DOCD: CPT | Performed by: INTERNAL MEDICINE

## 2020-11-04 PROCEDURE — 99214 OFFICE O/P EST MOD 30 MIN: CPT | Performed by: INTERNAL MEDICINE

## 2020-11-04 RX ORDER — ATORVASTATIN CALCIUM 20 MG/1
TABLET, FILM COATED ORAL
Qty: 90 TABLET | Refills: 3 | Status: SHIPPED | OUTPATIENT
Start: 2020-11-04 | End: 2021-03-01

## 2020-11-04 RX ORDER — LOSARTAN POTASSIUM 100 MG/1
TABLET ORAL
Qty: 90 TABLET | Refills: 3 | Status: SHIPPED | OUTPATIENT
Start: 2020-11-04 | End: 2022-01-13

## 2020-11-04 RX ORDER — ZOLPIDEM TARTRATE 5 MG/1
5 TABLET ORAL NIGHTLY PRN
Qty: 30 TABLET | Refills: 0 | Status: SHIPPED | OUTPATIENT
Start: 2020-11-04 | End: 2021-01-24

## 2020-11-04 RX ORDER — METFORMIN HYDROCHLORIDE 500 MG/1
1000 TABLET, EXTENDED RELEASE ORAL 2 TIMES DAILY WITH MEALS
Qty: 360 TABLET | Refills: 3 | Status: SHIPPED | OUTPATIENT
Start: 2020-11-04 | End: 2021-11-08

## 2020-11-04 NOTE — PROGRESS NOTES
HPI:    Patient ID: Alirio Pollock is a 77year old male. HPI    Diabetes  Follow upvisit. type 2 diabetes mellitus.    There are no hypoglycemic associated symptoms  Denies   blurred vision, chest pain,atigue,    foot paresthesiasfoot ulcerations, for adenopathy. Does not bruise/bleed easily. Psychiatric/Behavioral: Negative for agitation and behavioral problems.          Current Outpatient Medications   Medication Sig Dispense Refill   • zolpidem 5 MG Oral Tab Take 1 tablet (5 mg total) by mouth n tobacco: Former User    Alcohol use: No      Comment: (Beer) 3 beers occasionally    Drug use: No       PHYSICAL EXAM:    Physical Exam   Constitutional: He appears well-nourished. No distress. HENT:   Head: Normocephalic and atraumatic.    Right Ear: Ext (Oral)   Ht 5' 2\" (1.575 m)   Wt 145 lb (65.8 kg)   BMI 26.52 kg/m²   Controlled CPm  wthite coat HTN with dx of HTN    (Z12.11) Colon cancer screening  Plan: OP REFERRAL TO [facility] GI TELEPHONE COLON SCREEN,         OCCULT BLOOD, FECAL, IMMUNOASSAY        as

## 2020-11-05 ENCOUNTER — LAB ENCOUNTER (OUTPATIENT)
Dept: LAB | Age: 65
End: 2020-11-05
Attending: INTERNAL MEDICINE
Payer: MEDICARE

## 2020-11-05 DIAGNOSIS — E11.69 TYPE 2 DIABETES MELLITUS WITH OTHER SPECIFIED COMPLICATION, WITHOUT LONG-TERM CURRENT USE OF INSULIN (HCC): ICD-10-CM

## 2020-11-05 PROCEDURE — 83036 HEMOGLOBIN GLYCOSYLATED A1C: CPT

## 2020-11-05 PROCEDURE — 81001 URINALYSIS AUTO W/SCOPE: CPT | Performed by: INTERNAL MEDICINE

## 2020-11-05 PROCEDURE — 36415 COLL VENOUS BLD VENIPUNCTURE: CPT

## 2020-11-05 PROCEDURE — 84450 TRANSFERASE (AST) (SGOT): CPT

## 2020-11-05 PROCEDURE — 80048 BASIC METABOLIC PNL TOTAL CA: CPT

## 2020-11-05 PROCEDURE — 80061 LIPID PANEL: CPT

## 2020-11-05 PROCEDURE — 84460 ALANINE AMINO (ALT) (SGPT): CPT

## 2020-11-06 ENCOUNTER — LAB ENCOUNTER (OUTPATIENT)
Dept: LAB | Age: 65
End: 2020-11-06
Attending: INTERNAL MEDICINE
Payer: MEDICARE

## 2020-11-06 DIAGNOSIS — Z12.11 COLON CANCER SCREENING: ICD-10-CM

## 2020-11-06 PROCEDURE — 82274 ASSAY TEST FOR BLOOD FECAL: CPT

## 2021-01-24 RX ORDER — ZOLPIDEM TARTRATE 5 MG/1
TABLET ORAL
Qty: 30 TABLET | Refills: 0 | Status: SHIPPED | OUTPATIENT
Start: 2021-01-24 | End: 2021-03-28

## 2021-02-13 DIAGNOSIS — Z23 NEED FOR VACCINATION: ICD-10-CM

## 2021-02-19 ENCOUNTER — TELEPHONE (OUTPATIENT)
Dept: INTERNAL MEDICINE CLINIC | Facility: CLINIC | Age: 66
End: 2021-02-19

## 2021-03-01 RX ORDER — ATORVASTATIN CALCIUM 20 MG/1
TABLET, FILM COATED ORAL
Qty: 90 TABLET | Refills: 3 | Status: SHIPPED | OUTPATIENT
Start: 2021-03-01 | End: 2021-11-02

## 2021-03-01 NOTE — TELEPHONE ENCOUNTER
Per pharmacy pt is requesting refill for the following medication.       •  atorvastatin 20 MG Oral Tab, TAKE ONE TABLET BY MOUTH ONCE DAILY, Disp: 90 tablet, Rfl: 3

## 2021-03-08 RX ORDER — LANCETS 28 GAUGE
EACH MISCELLANEOUS
Qty: 100 EACH | Refills: 3 | Status: SHIPPED | OUTPATIENT
Start: 2021-03-08

## 2021-03-08 RX ORDER — BLOOD-GLUCOSE METER
KIT MISCELLANEOUS
Qty: 100 STRIP | Refills: 3 | Status: SHIPPED | OUTPATIENT
Start: 2021-03-08

## 2021-03-24 ENCOUNTER — IMMUNIZATION (OUTPATIENT)
Dept: LAB | Facility: HOSPITAL | Age: 66
End: 2021-03-24
Attending: HOSPITALIST
Payer: MEDICARE

## 2021-03-24 DIAGNOSIS — Z23 NEED FOR VACCINATION: Primary | ICD-10-CM

## 2021-03-24 PROCEDURE — 0011A SARSCOV2 VAC 100MCG/0.5ML IM: CPT

## 2021-03-28 RX ORDER — ZOLPIDEM TARTRATE 5 MG/1
TABLET ORAL
Qty: 30 TABLET | Refills: 0 | Status: SHIPPED | OUTPATIENT
Start: 2021-03-28 | End: 2021-05-24

## 2021-04-21 ENCOUNTER — IMMUNIZATION (OUTPATIENT)
Dept: LAB | Facility: HOSPITAL | Age: 66
End: 2021-04-21
Attending: EMERGENCY MEDICINE
Payer: MEDICARE

## 2021-04-21 ENCOUNTER — TELEPHONE (OUTPATIENT)
Dept: CASE MANAGEMENT | Age: 66
End: 2021-04-21

## 2021-04-21 DIAGNOSIS — Z23 NEED FOR VACCINATION: Primary | ICD-10-CM

## 2021-04-21 PROCEDURE — 0012A SARSCOV2 VAC 100MCG/0.5ML IM: CPT

## 2021-04-21 NOTE — TELEPHONE ENCOUNTER
Patient is eligible for a 2021 Medicare Annual Wellness visit. Left message to call back [phone].

## 2021-05-07 ENCOUNTER — TELEPHONE (OUTPATIENT)
Dept: CASE MANAGEMENT | Age: 66
End: 2021-05-07

## 2021-05-07 NOTE — TELEPHONE ENCOUNTER
Patient is eligible for a 2021 Medicare Annual Wellness visit. Discussed in detail w/patient. Patient declined to schedule appt.

## 2021-05-24 RX ORDER — ZOLPIDEM TARTRATE 5 MG/1
TABLET ORAL
Qty: 30 TABLET | Refills: 0 | Status: SHIPPED | OUTPATIENT
Start: 2021-05-24 | End: 2021-06-23

## 2021-06-23 ENCOUNTER — OFFICE VISIT (OUTPATIENT)
Dept: INTERNAL MEDICINE CLINIC | Facility: CLINIC | Age: 66
End: 2021-06-23
Payer: MEDICARE

## 2021-06-23 VITALS
DIASTOLIC BLOOD PRESSURE: 82 MMHG | HEIGHT: 62 IN | BODY MASS INDEX: 26.87 KG/M2 | SYSTOLIC BLOOD PRESSURE: 138 MMHG | WEIGHT: 146 LBS | HEART RATE: 103 BPM

## 2021-06-23 DIAGNOSIS — Z12.11 COLON CANCER SCREENING: ICD-10-CM

## 2021-06-23 DIAGNOSIS — H25.099 AGE-RELATED INCIPIENT CATARACT, UNSPECIFIED LATERALITY: ICD-10-CM

## 2021-06-23 DIAGNOSIS — Z00.00 MEDICARE ANNUAL WELLNESS VISIT, SUBSEQUENT: Primary | ICD-10-CM

## 2021-06-23 DIAGNOSIS — Z12.5 PROSTATE CANCER SCREENING: ICD-10-CM

## 2021-06-23 DIAGNOSIS — E78.5 HYPERLIPIDEMIA, UNSPECIFIED HYPERLIPIDEMIA TYPE: ICD-10-CM

## 2021-06-23 DIAGNOSIS — I10 ESSENTIAL HYPERTENSION: ICD-10-CM

## 2021-06-23 DIAGNOSIS — E55.9 VITAMIN D DEFICIENCY: ICD-10-CM

## 2021-06-23 DIAGNOSIS — H40.009 PREGLAUCOMA, UNSPECIFIED LATERALITY: ICD-10-CM

## 2021-06-23 DIAGNOSIS — I70.0 ATHEROSCLEROSIS OF ABDOMINAL AORTA (HCC): ICD-10-CM

## 2021-06-23 DIAGNOSIS — N18.31 STAGE 3A CHRONIC KIDNEY DISEASE (HCC): Chronic | ICD-10-CM

## 2021-06-23 DIAGNOSIS — Z00.00 ENCOUNTER FOR ANNUAL HEALTH EXAMINATION: ICD-10-CM

## 2021-06-23 DIAGNOSIS — E11.69 TYPE 2 DIABETES MELLITUS WITH OTHER SPECIFIED COMPLICATION, WITHOUT LONG-TERM CURRENT USE OF INSULIN (HCC): ICD-10-CM

## 2021-06-23 PROBLEM — N18.30 CKD (CHRONIC KIDNEY DISEASE) STAGE 3, GFR 30-59 ML/MIN (HCC): Chronic | Status: ACTIVE | Noted: 2021-06-23

## 2021-06-23 PROCEDURE — 3075F SYST BP GE 130 - 139MM HG: CPT | Performed by: INTERNAL MEDICINE

## 2021-06-23 PROCEDURE — G0439 PPPS, SUBSEQ VISIT: HCPCS | Performed by: INTERNAL MEDICINE

## 2021-06-23 PROCEDURE — 3008F BODY MASS INDEX DOCD: CPT | Performed by: INTERNAL MEDICINE

## 2021-06-23 PROCEDURE — 3079F DIAST BP 80-89 MM HG: CPT | Performed by: INTERNAL MEDICINE

## 2021-06-23 PROCEDURE — 99397 PER PM REEVAL EST PAT 65+ YR: CPT | Performed by: INTERNAL MEDICINE

## 2021-06-23 PROCEDURE — 96160 PT-FOCUSED HLTH RISK ASSMT: CPT | Performed by: INTERNAL MEDICINE

## 2021-06-23 RX ORDER — ZOLPIDEM TARTRATE 5 MG/1
5 TABLET ORAL NIGHTLY PRN
Qty: 30 TABLET | Refills: 0 | Status: SHIPPED | OUTPATIENT
Start: 2021-06-23 | End: 2021-08-10

## 2021-06-24 ENCOUNTER — LAB ENCOUNTER (OUTPATIENT)
Dept: LAB | Age: 66
End: 2021-06-24
Attending: INTERNAL MEDICINE
Payer: MEDICARE

## 2021-06-24 DIAGNOSIS — E11.69 TYPE 2 DIABETES MELLITUS WITH OTHER SPECIFIED COMPLICATION, WITHOUT LONG-TERM CURRENT USE OF INSULIN (HCC): ICD-10-CM

## 2021-06-24 DIAGNOSIS — E78.5 HYPERLIPIDEMIA, UNSPECIFIED HYPERLIPIDEMIA TYPE: ICD-10-CM

## 2021-06-24 DIAGNOSIS — I10 ESSENTIAL HYPERTENSION: ICD-10-CM

## 2021-06-24 DIAGNOSIS — E55.9 VITAMIN D DEFICIENCY: ICD-10-CM

## 2021-06-24 DIAGNOSIS — R89.9 ABNORMAL LABORATORY TEST RESULT: ICD-10-CM

## 2021-06-24 DIAGNOSIS — Z12.5 PROSTATE CANCER SCREENING: ICD-10-CM

## 2021-06-24 PROCEDURE — 80053 COMPREHEN METABOLIC PANEL: CPT

## 2021-06-24 PROCEDURE — 84156 ASSAY OF PROTEIN URINE: CPT

## 2021-06-24 PROCEDURE — 81015 MICROSCOPIC EXAM OF URINE: CPT

## 2021-06-24 PROCEDURE — 84443 ASSAY THYROID STIM HORMONE: CPT

## 2021-06-24 PROCEDURE — 80061 LIPID PANEL: CPT

## 2021-06-24 PROCEDURE — 84439 ASSAY OF FREE THYROXINE: CPT

## 2021-06-24 PROCEDURE — 83036 HEMOGLOBIN GLYCOSYLATED A1C: CPT

## 2021-06-24 PROCEDURE — 85027 COMPLETE CBC AUTOMATED: CPT

## 2021-06-24 PROCEDURE — 36415 COLL VENOUS BLD VENIPUNCTURE: CPT

## 2021-06-24 PROCEDURE — 82306 VITAMIN D 25 HYDROXY: CPT

## 2021-06-24 NOTE — PATIENT INSTRUCTIONS
Howard Schmid's SCREENING SCHEDULE   Tests on this list are recommended by your physician but may not be covered, or covered at this frequency, by your insurer. Please check with your insurance carrier before scheduling to verify coverage.    Patrick Kawasaki Each vaccine ([id number] & [id number]) covered once after 65 Prevnar 13: 04/13/2015    [id number]: 01/22/2020     No recommendations at this time    Hepatitis B One screening covered for patients with certain risk factors   -  No recommendations at this t different types of Advance Directives. It also has the State forms available on it's website for anyone to review and print using their home computer and printer. (the forms are also available in [address])  [email or web address] org  This link also has informa

## 2021-06-24 NOTE — PROGRESS NOTES
HPI:   Ismael Mtz is a 77year old male who presents for a Medicare Subsequent Annual Wellness visit (Pt already had Initial Annual Wellness).            Fall/Risk Assessment   He has been screened for Falls and is low risk: Fall/Risk Scorin    Cog (66.2 kg)  11/04/20 : 145 lb (65.8 kg)  08/26/20 : 146 lb (66.2 kg)     Last Cholesterol Labs:   Lab Results   Component Value Date    CHOLEST 154 06/24/2021    HDL 43 06/24/2021    LDL 95 06/24/2021    TRIG 86 06/24/2021          Last Chemistry Labs:   [employer] Constitutional: Negative for activity change, chills, fatigue and fever. HENT: Negative for ear discharge, nosebleeds, postnasal drip, rhinorrhea, sinus pressure and sore throat. Eyes: Negative for pain, discharge and redness.    Respiratory: Negativ speech of women and children: No I have trouble understanding the speaker in a large room such as at a meeting or place of Jewish: No   Many people I talk to seem to mumble (or don't speak clearly): No People get annoyed because I misunderstand what they Administered   • Covid-19 Vaccine Moderna 100 mcg/0.5 ml 03/24/2021, 04/21/2021   • FLU VAC High Dose 65 YRS & Older PRSV Free (24978) 01/22/2020, 11/04/2020   • Influenza 11/15/2017   • Pneumococcal (Prevnar 13) 04/13/2015   • Pneumovax 23 01/22/2020 understanding  and agrees with plan          Diet assessment: good     PLAN:  The patient indicates understanding of these issues and agrees to the plan. Reinforced healthy diet, lifestyle, and exercise. No follow-ups on file.      Krystle Caceres MD, 6 Screening  Covered for ages 52-80; only need ONE of the following:    Colonoscopy   Covered every 10 years    Covered every 2 years if patient is at high risk or previous colonoscopy was abnormal -    Colonoscopy Never done    [facility] Annually Lab Results   Component Value Date    CREATSERUM 1.33 (H) 06/24/2021         BUN Annually Lab Results   Component Value Date    BUN 21 (H) 06/24/2021       Drug Serum Conc Annually No results found for: DIGOXIN, DIG, VALP

## 2021-08-10 RX ORDER — ZOLPIDEM TARTRATE 5 MG/1
5 TABLET ORAL NIGHTLY PRN
Qty: 30 TABLET | Refills: 0 | Status: SHIPPED | OUTPATIENT
Start: 2021-08-10 | End: 2021-10-05

## 2021-08-10 NOTE — TELEPHONE ENCOUNTER
Please review; protocol failed/no protocol    Requested Prescriptions   Pending Prescriptions Disp Refills    ZOLPIDEM 5 MG Oral Tab [Pharmacy Med Name: ZOLPIDEM TARTRATE TABS 5MG] 30 tablet 0     Sig: TAKE 1 TABLET NIGHTLY AS NEEDED FOR SLEEP        There

## 2021-10-05 RX ORDER — ZOLPIDEM TARTRATE 5 MG/1
TABLET ORAL
Qty: 30 TABLET | Refills: 0 | Status: SHIPPED | OUTPATIENT
Start: 2021-10-05 | End: 2021-12-09

## 2021-11-01 NOTE — TELEPHONE ENCOUNTER
RN - when patient calls back, please clarify if patient using [facility]. Last refill (seen below) was sent to [facility]. May need to transfer prescription to correct pharmacy if needed. First Call attempt. Left Voicemail to call back our office. Office phone number provided with office hours. Postpone to tomorrow for follow-up. Outpatient Medication Detail     Disp Refills Start End    atorvastatin 20 MG Oral Tab 90 tablet 3 3/1/2021     Sig: TAKE ONE TABLET BY MOUTH ONCE DAILY    Sent to pharmacy as: Atorvastatin Calcium 20 MG Oral Tablet (LIPITOR)    E-Prescribing Status: Receipt confirmed by pharmacy (3/1/2021 11:55 PM CST)        Refill too soon, previously addressed (see above)  Refill passed per [facility] protocol.   Requested Prescriptions   Pending Prescriptions Disp Refills    ATORVASTATIN 20 MG Oral Tab [Pharmacy Med Name: ATORVASTATIN TABS 20MG] 90 tablet 3     Sig: TAKE 1 TABLET DAILY        Cholesterol Medication Protocol Passed - 10/31/2021 11:19 PM        Passed - ALT in past 12 months        Passed - LDL in past 12 months        Passed - Last ALT < 80       Lab Results   Component Value Date    ALT 60 06/24/2021             Passed - Last LDL < 130     Lab Results   Component Value Date    LDL 95 06/24/2021               Passed - Appointment in past 12 or next 3 months            Recent Outpatient Visits              4 months ago Medicare annual wellness visit, subsequent    [address] Khan MD    Office Visit    12 months ago Type 2 diabetes mellitus with other specified complication, without long-term current use of insulin [facility])    [address] Khan MD    Office Visit    1 year ago Hematuria, unspecified type    [facility], Traci Plascencia MD    Office Visit    1 year ago Routine general medical examination at a health care facility    [facility], Carito Robert MD    Office Visit    3 years ago Elevated PSA, less than 10 ng/ml    [facility] for Aida Waterman MD    Office Visit

## 2021-11-02 RX ORDER — ATORVASTATIN CALCIUM 20 MG/1
TABLET, FILM COATED ORAL
Qty: 90 TABLET | Refills: 1 | Status: SHIPPED | OUTPATIENT
Start: 2021-11-02 | End: 2022-04-29

## 2021-11-02 NOTE — TELEPHONE ENCOUNTER
Refill passed per [facility] protocol.   Requested Prescriptions   Pending Prescriptions Disp Refills    ATORVASTATIN 20 MG Oral Tab [Pharmacy Med Name: ATORVASTATIN TABS 20MG] 90 tablet 3     Sig: TAKE 1 TABLET DAILY        Cholesterol Medication Protocol Passed - 11/1/2021  8:57 AM        Passed - ALT in past 12 months        Passed - LDL in past 12 months        Passed - Last ALT < 80       Lab Results   Component Value Date    ALT 60 06/24/2021             Passed - Last LDL < 130     Lab Results   Component Value Date    LDL 95 06/24/2021               Passed - Appointment in past 12 or next 3 months             Recent Outpatient Visits              4 months ago Medicare annual wellness visit, subsequent    [address] E[address] Khan MD    Office Visit    12 months ago Type 2 diabetes mellitus with other specified complication, without long-term current use of insulin [facility])    [address] Khan MD    Office Visit    1 year ago Hematuria, unspecified type    [address], [facility], Jo Zarate MD    Office Visit    1 year ago Routine general medical examination at a health care facility    [place] [facility], [address], Ese Mayfield MD    Office Visit    3 years ago Elevated PSA, less than 10 ng/ml    [facility] Kartik Gracia [facility]    Office Visit

## 2021-11-08 RX ORDER — METFORMIN HYDROCHLORIDE 500 MG/1
TABLET, EXTENDED RELEASE ORAL
Qty: 360 TABLET | Refills: 3 | Status: SHIPPED | OUTPATIENT
Start: 2021-11-08

## 2021-12-09 RX ORDER — ZOLPIDEM TARTRATE 5 MG/1
TABLET ORAL
Qty: 30 TABLET | Refills: 0 | Status: SHIPPED | OUTPATIENT
Start: 2021-12-09

## 2022-01-13 RX ORDER — LOSARTAN POTASSIUM 100 MG/1
TABLET ORAL
Qty: 90 TABLET | Refills: 3 | Status: SHIPPED | OUTPATIENT
Start: 2022-01-13

## 2022-01-13 NOTE — TELEPHONE ENCOUNTER
Please review. Protocol failed or has no protocol.     Requested Prescriptions   Pending Prescriptions Disp Refills    LOSARTAN 100 MG Oral Tab [Pharmacy Med Name: LOSARTAN TABS 100MG] 90 tablet 3     Sig: TAKE 1 TABLET DAILY (OFFICE VISIT REQUIRED PRIOR TO

## 2022-03-02 RX ORDER — BLOOD-GLUCOSE METER
KIT MISCELLANEOUS
Qty: 100 STRIP | Refills: 3 | Status: SHIPPED | OUTPATIENT
Start: 2022-03-02

## 2022-03-02 RX ORDER — LANCETS 28 GAUGE
EACH MISCELLANEOUS
Qty: 100 EACH | Refills: 3 | Status: SHIPPED | OUTPATIENT
Start: 2022-03-02

## 2022-03-02 NOTE — TELEPHONE ENCOUNTER
Refill passed per [facility] protocol. Requested Prescriptions   Pending Prescriptions Disp Refills    FREESTYLE LITE TEST In Vitro Strip [Pharmacy Med Name: FREESTYLE LITE STRIPS 100'S] 100 strip 3     Sig: Test once daily .         Diabetic Supplies Protocol Passed - 3/2/2022 12:20 AM        Passed - Appointment in past 12 or next 3 months           FREESTYLE LANCETS Does not apply Misc [Pharmacy Med Name: Mammie Magyar 100'S 28G] 100 each 3     Sig: TEST ONCE DAILY        Diabetic Supplies Protocol Passed - 3/2/2022 12:20 AM        Passed - Appointment in past 12 or next 3 months              Recent Outpatient Visits              8 months ago Medicare annual wellness visit, subsequent    Maranda Castanon MD    Office Visit    1 year ago Type 2 diabetes mellitus with other specified complication, without long-term current use of insulin [facility])    Maranda Castanon MD    Office Visit    1 year ago Hematuria, unspecified type    Nia Muhammad [place], MD    Office Visit    2 years ago Routine general medical examination at a health care facility    [facility], [address], Maranda Fenton MD    Office Visit    3 years ago Elevated PSA, less than 10 ng/ml    [facility] [place] [facility] for Juni Domínguez MD    Office Visit

## 2022-03-16 ENCOUNTER — TELEPHONE (OUTPATIENT)
Dept: INTERNAL MEDICINE CLINIC | Facility: CLINIC | Age: 67
End: 2022-03-16

## 2022-03-16 DIAGNOSIS — Z12.11 COLON CANCER SCREENING: Primary | ICD-10-CM

## 2022-04-29 RX ORDER — ATORVASTATIN CALCIUM 20 MG/1
TABLET, FILM COATED ORAL
Qty: 90 TABLET | Refills: 1 | Status: SHIPPED | OUTPATIENT
Start: 2022-04-29 | End: 2022-11-01

## 2022-04-29 NOTE — TELEPHONE ENCOUNTER
Refill passed per [employer] [facility] protocol.      Requested Prescriptions   Pending Prescriptions Disp Refills    ATORVASTATIN 20 MG Oral Tab [Pharmacy Med Name: ATORVASTATIN TABS 20MG] 90 tablet 3     Sig: TAKE 1 TABLET DAILY        Cholesterol Medication Protocol Passed - 4/29/2022 12:41 AM        Passed - ALT in past 12 months        Passed - LDL in past 12 months        Passed - Last ALT < 80       Lab Results   Component Value Date    ALT 60 06/24/2021             Passed - Last LDL < 130     Lab Results   Component Value Date    LDL 95 06/24/2021               Passed - Appointment in past 12 or next 3 months                Recent Outpatient Visits              10 months ago Medicare annual wellness visit, subsequent    Haily Angulo MD    Office Visit    1 year ago Type 2 diabetes mellitus with other specified complication, without long-term current use of insulin [facility])    [address], Winifred Klinefelter, MD    Office Visit    1 year ago Hematuria, unspecified type    Nia Ruth Nelle Neighbours, MD    Office Visit    2 years ago Routine general medical examination at a health care facility    [place] [employer] [place], [facility], [address], Winifred Klinefelter, MD    Office Visit    3 years ago Elevated PSA, less than 10 ng/ml    [facility] for Gavin Guerrero MD    Office Visit

## 2022-05-17 ENCOUNTER — TELEPHONE (OUTPATIENT)
Dept: INTERNAL MEDICINE CLINIC | Facility: CLINIC | Age: 67
End: 2022-05-17

## 2022-07-27 ENCOUNTER — OFFICE VISIT (OUTPATIENT)
Dept: INTERNAL MEDICINE CLINIC | Facility: CLINIC | Age: 67
End: 2022-07-27
Payer: MEDICARE

## 2022-07-27 VITALS
WEIGHT: 141 LBS | HEIGHT: 62 IN | DIASTOLIC BLOOD PRESSURE: 92 MMHG | BODY MASS INDEX: 25.95 KG/M2 | SYSTOLIC BLOOD PRESSURE: 155 MMHG | HEART RATE: 106 BPM

## 2022-07-27 DIAGNOSIS — H25.099 AGE-RELATED INCIPIENT CATARACT, UNSPECIFIED LATERALITY: ICD-10-CM

## 2022-07-27 DIAGNOSIS — I70.0 ATHEROSCLEROSIS OF ABDOMINAL AORTA (HCC): ICD-10-CM

## 2022-07-27 DIAGNOSIS — H40.009 PREGLAUCOMA, UNSPECIFIED LATERALITY: ICD-10-CM

## 2022-07-27 DIAGNOSIS — E55.9 VITAMIN D DEFICIENCY: ICD-10-CM

## 2022-07-27 DIAGNOSIS — E11.69 TYPE 2 DIABETES MELLITUS WITH OTHER SPECIFIED COMPLICATION, WITHOUT LONG-TERM CURRENT USE OF INSULIN (HCC): ICD-10-CM

## 2022-07-27 DIAGNOSIS — N18.31 STAGE 3A CHRONIC KIDNEY DISEASE (HCC): Chronic | ICD-10-CM

## 2022-07-27 DIAGNOSIS — E78.5 HYPERLIPIDEMIA, UNSPECIFIED HYPERLIPIDEMIA TYPE: ICD-10-CM

## 2022-07-27 DIAGNOSIS — Z00.00 ENCOUNTER FOR ANNUAL HEALTH EXAMINATION: Primary | ICD-10-CM

## 2022-07-27 DIAGNOSIS — Z12.11 SCREENING FOR COLON CANCER: ICD-10-CM

## 2022-07-27 DIAGNOSIS — Z12.5 PROSTATE CANCER SCREENING: ICD-10-CM

## 2022-07-27 DIAGNOSIS — I10 ESSENTIAL HYPERTENSION: ICD-10-CM

## 2022-07-27 RX ORDER — ZOLPIDEM TARTRATE 5 MG/1
5 TABLET ORAL NIGHTLY PRN
Qty: 30 TABLET | Refills: 0 | Status: SHIPPED | OUTPATIENT
Start: 2022-07-27

## 2022-07-28 ENCOUNTER — LAB ENCOUNTER (OUTPATIENT)
Dept: LAB | Age: 67
End: 2022-07-28
Attending: INTERNAL MEDICINE
Payer: MEDICARE

## 2022-07-28 ENCOUNTER — EKG ENCOUNTER (OUTPATIENT)
Dept: LAB | Age: 67
End: 2022-07-28
Attending: INTERNAL MEDICINE
Payer: MEDICARE

## 2022-07-28 DIAGNOSIS — E55.9 VITAMIN D DEFICIENCY: ICD-10-CM

## 2022-07-28 DIAGNOSIS — I10 ESSENTIAL HYPERTENSION: ICD-10-CM

## 2022-07-28 DIAGNOSIS — Z12.5 PROSTATE CANCER SCREENING: ICD-10-CM

## 2022-07-28 DIAGNOSIS — I70.0 ATHEROSCLEROSIS OF ABDOMINAL AORTA (HCC): ICD-10-CM

## 2022-07-28 DIAGNOSIS — E78.5 HYPERLIPIDEMIA, UNSPECIFIED HYPERLIPIDEMIA TYPE: ICD-10-CM

## 2022-07-28 DIAGNOSIS — E11.69 TYPE 2 DIABETES MELLITUS WITH OTHER SPECIFIED COMPLICATION, WITHOUT LONG-TERM CURRENT USE OF INSULIN (HCC): ICD-10-CM

## 2022-07-28 LAB
ALBUMIN SERPL-MCNC: 4.3 G/DL (ref 3.4–5)
ALBUMIN/GLOB SERPL: 1.1 {RATIO} (ref 1–2)
ALP LIVER SERPL-CCNC: 49 U/L
ALT SERPL-CCNC: 64 U/L
ANION GAP SERPL CALC-SCNC: 10 MMOL/L (ref 0–18)
BILIRUB SERPL-MCNC: 1.1 MG/DL (ref 0.1–2)
BILIRUB UR QL: NEGATIVE
BUN BLD-MCNC: 19 MG/DL (ref 7–18)
BUN/CREAT SERPL: 12.5 (ref 10–20)
CALCIUM BLD-MCNC: 10.8 MG/DL (ref 8.5–10.1)
CHLORIDE SERPL-SCNC: 103 MMOL/L (ref 98–112)
CHOLEST SERPL-MCNC: 167 MG/DL (ref ?–200)
CLARITY UR: CLEAR
CO2 SERPL-SCNC: 24 MMOL/L (ref 21–32)
COLOR UR: YELLOW
COMPLEXED PSA SERPL-MCNC: 3.18 NG/ML (ref ?–4)
CREAT BLD-MCNC: 1.52 MG/DL
DEPRECATED RDW RBC AUTO: 42.4 FL (ref 35.1–46.3)
ERYTHROCYTE [DISTWIDTH] IN BLOOD BY AUTOMATED COUNT: 12.9 % (ref 11–15)
FASTING PATIENT LIPID ANSWER: YES
FASTING STATUS PATIENT QL REPORTED: YES
FOLATE SERPL-MCNC: >20 NG/ML (ref 8.7–?)
GLOBULIN PLAS-MCNC: 3.9 G/DL (ref 2.8–4.4)
GLUCOSE BLD-MCNC: 127 MG/DL (ref 70–99)
GLUCOSE UR-MCNC: NEGATIVE MG/DL
HCT VFR BLD AUTO: 48.6 %
HDLC SERPL-MCNC: 47 MG/DL (ref 40–59)
HGB BLD-MCNC: 16.3 G/DL
HGB UR QL STRIP.AUTO: NEGATIVE
KETONES UR-MCNC: NEGATIVE MG/DL
LDLC SERPL CALC-MCNC: 97 MG/DL (ref ?–100)
LEUKOCYTE ESTERASE UR QL STRIP.AUTO: NEGATIVE
MCH RBC QN AUTO: 30.4 PG (ref 26–34)
MCHC RBC AUTO-ENTMCNC: 33.5 G/DL (ref 31–37)
MCV RBC AUTO: 90.5 FL
NITRITE UR QL STRIP.AUTO: NEGATIVE
NONHDLC SERPL-MCNC: 120 MG/DL (ref ?–130)
OSMOLALITY SERPL CALC.SUM OF ELEC: 288 MOSM/KG (ref 275–295)
PH UR: 5.5 [PH] (ref 5–8)
PLATELET # BLD AUTO: 157 10(3)UL (ref 150–450)
PROT SERPL-MCNC: 8.2 G/DL (ref 6.4–8.2)
PROT UR-MCNC: 8.7 MG/DL
PROT UR-MCNC: NEGATIVE MG/DL
RBC # BLD AUTO: 5.37 X10(6)UL
SODIUM SERPL-SCNC: 137 MMOL/L (ref 136–145)
SP GR UR STRIP: 1.01 (ref 1–1.03)
T4 FREE SERPL-MCNC: 1.1 NG/DL (ref 0.8–1.7)
TRIGL SERPL-MCNC: 131 MG/DL (ref 30–149)
TSI SER-ACNC: 0.48 MIU/ML (ref 0.36–3.74)
UROBILINOGEN UR STRIP-ACNC: 0.2
VIT B12 SERPL-MCNC: 885 PG/ML (ref 193–986)
VIT D+METAB SERPL-MCNC: 37.1 NG/ML (ref 30–100)
VLDLC SERPL CALC-MCNC: 22 MG/DL (ref 0–30)
WBC # BLD AUTO: 7.4 X10(3) UL (ref 4–11)

## 2022-07-28 PROCEDURE — 84439 ASSAY OF FREE THYROXINE: CPT

## 2022-07-28 PROCEDURE — 82746 ASSAY OF FOLIC ACID SERUM: CPT

## 2022-07-28 PROCEDURE — 84156 ASSAY OF PROTEIN URINE: CPT

## 2022-07-28 PROCEDURE — 81003 URINALYSIS AUTO W/O SCOPE: CPT

## 2022-07-28 PROCEDURE — 80053 COMPREHEN METABOLIC PANEL: CPT

## 2022-07-28 PROCEDURE — 93005 ELECTROCARDIOGRAM TRACING: CPT

## 2022-07-28 PROCEDURE — 93010 ELECTROCARDIOGRAM REPORT: CPT | Performed by: INTERNAL MEDICINE

## 2022-07-28 PROCEDURE — 80061 LIPID PANEL: CPT

## 2022-07-28 PROCEDURE — 82306 VITAMIN D 25 HYDROXY: CPT

## 2022-07-28 PROCEDURE — 36415 COLL VENOUS BLD VENIPUNCTURE: CPT

## 2022-07-28 PROCEDURE — 84443 ASSAY THYROID STIM HORMONE: CPT

## 2022-07-28 PROCEDURE — 85027 COMPLETE CBC AUTOMATED: CPT

## 2022-07-28 PROCEDURE — 82607 VITAMIN B-12: CPT

## 2022-08-05 ENCOUNTER — LAB ENCOUNTER (OUTPATIENT)
Dept: LAB | Age: 67
End: 2022-08-05
Attending: INTERNAL MEDICINE
Payer: MEDICARE

## 2022-08-05 DIAGNOSIS — E11.69 TYPE 2 DIABETES MELLITUS WITH OTHER SPECIFIED COMPLICATION, WITHOUT LONG-TERM CURRENT USE OF INSULIN (HCC): ICD-10-CM

## 2022-08-05 LAB
ANION GAP SERPL CALC-SCNC: 11 MMOL/L (ref 0–18)
BUN BLD-MCNC: 16 MG/DL (ref 7–18)
BUN/CREAT SERPL: 12.3 (ref 10–20)
CALCIUM BLD-MCNC: 10.2 MG/DL (ref 8.5–10.1)
CHLORIDE SERPL-SCNC: 99 MMOL/L (ref 98–112)
CO2 SERPL-SCNC: 25 MMOL/L (ref 21–32)
CREAT BLD-MCNC: 1.3 MG/DL
EST. AVERAGE GLUCOSE BLD GHB EST-MCNC: 166 MG/DL (ref 68–126)
FASTING STATUS PATIENT QL REPORTED: YES
GFR SERPLBLD BASED ON 1.73 SQ M-ARVRAT: 59 ML/MIN/1.73M2 (ref 60–?)
GLUCOSE BLD-MCNC: 125 MG/DL (ref 70–99)
HBA1C MFR BLD: 7.4 % (ref ?–5.7)
OSMOLALITY SERPL CALC.SUM OF ELEC: 283 MOSM/KG (ref 275–295)
POTASSIUM SERPL-SCNC: 5.1 MMOL/L (ref 3.5–5.1)
SODIUM SERPL-SCNC: 135 MMOL/L (ref 136–145)

## 2022-08-05 PROCEDURE — 83036 HEMOGLOBIN GLYCOSYLATED A1C: CPT

## 2022-08-05 PROCEDURE — 36415 COLL VENOUS BLD VENIPUNCTURE: CPT

## 2022-08-05 PROCEDURE — 80048 BASIC METABOLIC PNL TOTAL CA: CPT

## 2022-10-26 ENCOUNTER — MED REC SCAN ONLY (OUTPATIENT)
Dept: INTERNAL MEDICINE CLINIC | Facility: CLINIC | Age: 67
End: 2022-10-26

## 2022-11-01 RX ORDER — ATORVASTATIN CALCIUM 20 MG/1
TABLET, FILM COATED ORAL
Qty: 90 TABLET | Refills: 3 | Status: SHIPPED | OUTPATIENT
Start: 2022-11-01

## 2022-11-04 RX ORDER — METFORMIN HYDROCHLORIDE 500 MG/1
1000 TABLET, EXTENDED RELEASE ORAL 2 TIMES DAILY WITH MEALS
Qty: 360 TABLET | Refills: 1 | Status: SHIPPED | OUTPATIENT
Start: 2022-11-04

## 2022-11-04 NOTE — TELEPHONE ENCOUNTER
Refill passed per [facility] protocol.   Requested Prescriptions   Pending Prescriptions Disp Refills    METFORMIN  MG Oral Tablet 24 Hr [Pharmacy Med Name: METFORMIN HCL ER TABS 500MG] 360 tablet 3     Sig: TAKE 2 TABLETS TWICE A DAY WITH MEALS       Diabetes Medication Protocol Passed - 11/3/2022 12:24 AM        Passed - Last A1C < 7.5 and within past 6 months     Lab Results   Component Value Date    A1C 7.4 (H) 08/05/2022             Passed - In person appointment or virtual visit in the past 6 mos or appointment in next 3 mos     Recent Outpatient Visits              3 months ago Encounter for annual health examination    Maria E Watkins MD    Office Visit    1 year ago Chao Heath annual wellness visit, subsequent    Maria E Watkins MD    Office Visit    2 years ago Type 2 diabetes mellitus with other specified complication, without long-term current use of insulin [facility])    [address], Zach Youngblood MD    Office Visit    2 years ago Hematuria, unspecified type    Nia Johnston, Ce Arthur MD    Office Visit    2 years ago Routine general medical examination at a health care facility    [facility], [address], Zach Youngblood MD    Office Visit                      Passed - [facility] or GFRNAA > 50     GFR Evaluation  EGFRCR: 61 , resulted on 8/5/2022          Passed - GFR in the past 12 months           Recent Outpatient Visits              3 months ago Encounter for annual health examination    Maria E Watkins MD    Office Visit    1 year ago Medicare annual wellness visit, subsequent    Maria E Watkins MD    Office Visit    2 years ago Type 2 diabetes mellitus with other specified complication, without long-term current use of insulin [facility])    [facility], [address], Ivan Chacon MD    Office Visit    2 years ago Hematuria, unspecified type    Nia Rosario Enrigue Sparrow, MD    Office Visit    2 years ago Routine general medical examination at a health care facility    [facility], [facility], [address], Ivan Chacon MD    Office Visit

## 2022-12-09 RX ORDER — ZOLPIDEM TARTRATE 5 MG/1
TABLET ORAL
Qty: 30 TABLET | Refills: 0 | Status: SHIPPED | OUTPATIENT
Start: 2022-12-09

## 2023-01-09 RX ORDER — LOSARTAN POTASSIUM 100 MG/1
TABLET ORAL
Qty: 90 TABLET | Refills: 3 | Status: SHIPPED | OUTPATIENT
Start: 2023-01-09

## 2023-01-11 ENCOUNTER — OFFICE VISIT (OUTPATIENT)
Dept: INTERNAL MEDICINE CLINIC | Facility: CLINIC | Age: 68
End: 2023-01-11

## 2023-01-11 DIAGNOSIS — E78.5 HYPERLIPIDEMIA, UNSPECIFIED HYPERLIPIDEMIA TYPE: ICD-10-CM

## 2023-01-11 DIAGNOSIS — E11.69 TYPE 2 DIABETES MELLITUS WITH OTHER SPECIFIED COMPLICATION, WITHOUT LONG-TERM CURRENT USE OF INSULIN (HCC): Primary | ICD-10-CM

## 2023-01-11 DIAGNOSIS — I70.0 ATHEROSCLEROSIS OF ABDOMINAL AORTA (HCC): ICD-10-CM

## 2023-01-11 DIAGNOSIS — I10 ESSENTIAL HYPERTENSION: ICD-10-CM

## 2023-01-11 DIAGNOSIS — N18.31 STAGE 3A CHRONIC KIDNEY DISEASE (HCC): ICD-10-CM

## 2023-01-11 DIAGNOSIS — R05.1 ACUTE COUGH: ICD-10-CM

## 2023-01-11 PROCEDURE — 3077F SYST BP >= 140 MM HG: CPT | Performed by: INTERNAL MEDICINE

## 2023-01-11 PROCEDURE — 3080F DIAST BP >= 90 MM HG: CPT | Performed by: INTERNAL MEDICINE

## 2023-01-11 PROCEDURE — 1126F AMNT PAIN NOTED NONE PRSNT: CPT | Performed by: INTERNAL MEDICINE

## 2023-01-11 PROCEDURE — 99214 OFFICE O/P EST MOD 30 MIN: CPT | Performed by: INTERNAL MEDICINE

## 2023-01-11 PROCEDURE — 3008F BODY MASS INDEX DOCD: CPT | Performed by: INTERNAL MEDICINE

## 2023-01-11 RX ORDER — METOPROLOL SUCCINATE 50 MG/1
50 TABLET, EXTENDED RELEASE ORAL DAILY
Qty: 90 TABLET | Refills: 3 | Status: SHIPPED | OUTPATIENT
Start: 2023-01-11 | End: 2024-01-06

## 2023-01-11 RX ORDER — DOXYCYCLINE 100 MG/1
100 CAPSULE ORAL 2 TIMES DAILY
Qty: 20 CAPSULE | Refills: 0 | Status: SHIPPED | OUTPATIENT
Start: 2023-01-11 | End: 2023-01-21

## 2023-01-12 ENCOUNTER — LAB ENCOUNTER (OUTPATIENT)
Dept: LAB | Age: 68
End: 2023-01-12
Attending: INTERNAL MEDICINE
Payer: MEDICARE

## 2023-01-12 VITALS
TEMPERATURE: 99 F | OXYGEN SATURATION: 97 % | SYSTOLIC BLOOD PRESSURE: 150 MMHG | HEIGHT: 62 IN | BODY MASS INDEX: 25.95 KG/M2 | DIASTOLIC BLOOD PRESSURE: 100 MMHG | HEART RATE: 108 BPM | WEIGHT: 141 LBS

## 2023-01-12 DIAGNOSIS — E11.69 DIABETES MELLITUS ASSOCIATED WITH HORMONAL ETIOLOGY (HCC): Primary | ICD-10-CM

## 2023-01-12 DIAGNOSIS — E11.69 TYPE 2 DIABETES MELLITUS WITH OTHER SPECIFIED COMPLICATION, UNSPECIFIED WHETHER LONG TERM INSULIN USE (HCC): ICD-10-CM

## 2023-01-12 DIAGNOSIS — E11.69 TYPE 2 DIABETES MELLITUS WITH OTHER SPECIFIED COMPLICATION, WITHOUT LONG-TERM CURRENT USE OF INSULIN (HCC): ICD-10-CM

## 2023-01-12 LAB
ALT SERPL-CCNC: 25 U/L
ANION GAP SERPL CALC-SCNC: 11 MMOL/L (ref 0–18)
AST SERPL-CCNC: 21 U/L (ref 15–37)
BUN BLD-MCNC: 20 MG/DL (ref 7–18)
BUN/CREAT SERPL: 13.9 (ref 10–20)
CALCIUM BLD-MCNC: 10.3 MG/DL (ref 8.5–10.1)
CHLORIDE SERPL-SCNC: 100 MMOL/L (ref 98–112)
CHOLEST SERPL-MCNC: 138 MG/DL (ref ?–200)
CO2 SERPL-SCNC: 24 MMOL/L (ref 21–32)
CREAT BLD-MCNC: 1.44 MG/DL
EST. AVERAGE GLUCOSE BLD GHB EST-MCNC: 180 MG/DL (ref 68–126)
FASTING PATIENT LIPID ANSWER: YES
FASTING STATUS PATIENT QL REPORTED: YES
GFR SERPLBLD BASED ON 1.73 SQ M-ARVRAT: 53 ML/MIN/1.73M2 (ref 60–?)
GLUCOSE BLD-MCNC: 151 MG/DL (ref 70–99)
HBA1C MFR BLD: 7.9 % (ref ?–5.7)
HDLC SERPL-MCNC: 41 MG/DL (ref 40–59)
LDLC SERPL CALC-MCNC: 80 MG/DL (ref ?–100)
NONHDLC SERPL-MCNC: 97 MG/DL (ref ?–130)
OSMOLALITY SERPL CALC.SUM OF ELEC: 286 MOSM/KG (ref 275–295)
POTASSIUM SERPL-SCNC: 4.3 MMOL/L (ref 3.5–5.1)
SODIUM SERPL-SCNC: 135 MMOL/L (ref 136–145)
TRIGL SERPL-MCNC: 89 MG/DL (ref 30–149)
VLDLC SERPL CALC-MCNC: 14 MG/DL (ref 0–30)

## 2023-01-12 PROCEDURE — 3051F HG A1C>EQUAL 7.0%<8.0%: CPT | Performed by: INTERNAL MEDICINE

## 2023-01-12 PROCEDURE — 36415 COLL VENOUS BLD VENIPUNCTURE: CPT | Performed by: INTERNAL MEDICINE

## 2023-01-12 PROCEDURE — 80061 LIPID PANEL: CPT

## 2023-01-12 PROCEDURE — 84460 ALANINE AMINO (ALT) (SGPT): CPT

## 2023-01-12 PROCEDURE — 84450 TRANSFERASE (AST) (SGOT): CPT

## 2023-01-12 PROCEDURE — 83036 HEMOGLOBIN GLYCOSYLATED A1C: CPT | Performed by: INTERNAL MEDICINE

## 2023-01-12 PROCEDURE — 80048 BASIC METABOLIC PNL TOTAL CA: CPT | Performed by: INTERNAL MEDICINE

## 2023-01-30 ENCOUNTER — OFFICE VISIT (OUTPATIENT)
Dept: ENDOCRINOLOGY CLINIC | Facility: CLINIC | Age: 68
End: 2023-01-30

## 2023-01-30 VITALS
WEIGHT: 141 LBS | SYSTOLIC BLOOD PRESSURE: 162 MMHG | DIASTOLIC BLOOD PRESSURE: 94 MMHG | HEART RATE: 105 BPM | BODY MASS INDEX: 26 KG/M2

## 2023-01-30 DIAGNOSIS — E11.65 UNCONTROLLED TYPE 2 DIABETES MELLITUS WITH HYPERGLYCEMIA (HCC): Primary | ICD-10-CM

## 2023-01-30 PROCEDURE — 3077F SYST BP >= 140 MM HG: CPT

## 2023-01-30 PROCEDURE — 99215 OFFICE O/P EST HI 40 MIN: CPT

## 2023-01-30 PROCEDURE — 3080F DIAST BP >= 90 MM HG: CPT

## 2023-02-27 RX ORDER — BLOOD-GLUCOSE METER
KIT MISCELLANEOUS
Qty: 100 STRIP | Refills: 3 | Status: SHIPPED | OUTPATIENT
Start: 2023-02-27

## 2023-02-27 NOTE — TELEPHONE ENCOUNTER
Refill passed per [place] [employer], [facility] protocol. Requested Prescriptions   Pending Prescriptions Disp Refills    FREESTYLE LITE TEST In Vitro Strip [Pharmacy Med Name: FREESTYLE LITE STRIPS 100'S] 100 strip 3     Sig: Test once daily .        Diabetic Supplies Protocol Passed - 2/26/2023 11:26 PM        Passed - In person appointment or virtual visit in the past 12 mos or appointment in next 3 mos     Recent Outpatient Visits              4 weeks ago Uncontrolled type 2 diabetes mellitus with hyperglycemia [facility])    [address], Martin Castrejon, SHASHA    Office Visit    1 month ago Type 2 diabetes mellitus with other specified complication, without long-term current use of insulin ([address]    [address], Abhijit Horowitz MD    Office Visit    7 months ago Encounter for annual health examination    [address], Abhijit Horowitz MD    Office Visit    1 year ago Chao Heath annual wellness visit, subsequent    [address] MD    Office Visit    2 years ago Type 2 diabetes mellitus with other specified complication, without long-term current use of insulin ([address]    [address], [address], Abhijit Horowitz MD    Office Visit                         Recent Outpatient Visits              4 weeks ago Uncontrolled type 2 diabetes mellitus with hyperglycemia [facility])    [address], [address], Martin Castrejon, SHASHA    Office Visit    1 month ago Type 2 diabetes mellitus with other specified complication, without long-term current use of insulin ([address]    Brianna Pereyra MD    Office Visit    7 months ago Encounter for annual health examination    Brianna Pereyra MD    Office Visit    1 year ago Medicare annual wellness visit, subsequent    Ross Jones MD    Office Visit    2 years ago Type 2 diabetes mellitus with other specified complication, without long-term current use of insulin ([facility]    [address], Kassie Win MD    Office Visit

## 2023-04-26 NOTE — TELEPHONE ENCOUNTER
Protocol failed or has No Protocol, please review  Requested Prescriptions   Pending Prescriptions Disp Refills    METFORMIN  MG Oral Tablet 24 Hr [Pharmacy Med Name: METFORMIN HCL ER TABS 500MG] 360 tablet 3     Sig: TAKE 2 TABLETS IN THE MORNING AND 2 TABLETS IN THE EVENING WITH MEALS       Diabetes Medication Protocol Failed - 4/24/2023 11:26 PM        Failed - Last A1C < 7.5 and within past 6 months     Lab Results   Component Value Date    A1C 7.9 (H) 01/12/2023               Passed - In person appointment or virtual visit in the past 6 mos or appointment in next 3 mos     Recent Outpatient Visits              2 months ago Uncontrolled type 2 diabetes mellitus with hyperglycemia [facility])    Cash Moraes Margreta Dock, APRN    Office Visit    3 months ago Type 2 diabetes mellitus with other specified complication, without long-term current use of insulin ([address]    Maranda Moraes MD    Office Visit    9 months ago Encounter for annual health examination    Maranda Moraes MD    Office Visit    1 year ago Medicare annual wellness visit, subsequent    [address], Maranda Roth MD    Office Visit    2 years ago Type 2 diabetes mellitus with other specified complication, without long-term current use of insulin ([address]    [address], Tr[address], Maranda Fenton MD    Office Visit                      Passed - EGFRCR or GFRNAA > 50     GFR Evaluation  EGFRCR: 53 , resulted on 1/12/2023            Passed - GFR in the past 12 months             Recent Outpatient Visits              2 months ago Uncontrolled type 2 diabetes mellitus with hyperglycemia [facility])    [address], [address], Irvin Castrejon APRN    Office Visit    3 months ago Type 2 diabetes mellitus with other specified complication, without long-term current use of insulin (HCC)    Salvatore Encinas MD    Office Visit    9 months ago Encounter for annual health examination    Salvatore Encinas MD    Office Visit    1 year ago Chao Heath annual wellness visit, subsequent    Salvatore Encinas MD    Office Visit    2 years ago Type 2 diabetes mellitus with other specified complication, without long-term current use of insulin [facility])    [address] [facility], Roge Mcgrath MD    Office Visit

## 2023-04-27 RX ORDER — METFORMIN HYDROCHLORIDE 500 MG/1
1000 TABLET, EXTENDED RELEASE ORAL 2 TIMES DAILY WITH MEALS
Qty: 360 TABLET | Refills: 3 | Status: SHIPPED | OUTPATIENT
Start: 2023-04-27

## 2023-08-06 RX ORDER — ZOLPIDEM TARTRATE 5 MG/1
5 TABLET ORAL NIGHTLY PRN
Qty: 30 TABLET | Refills: 1 | Status: SHIPPED | OUTPATIENT
Start: 2023-08-06

## 2023-08-06 NOTE — TELEPHONE ENCOUNTER
No protocol for requested medication. Please advise on refill request.      Requested Prescriptions     Pending Prescriptions Disp Refills    ZOLPIDEM 5 MG Oral Tab [Pharmacy Med Name: Zolpidem Tartrate 5 MG Oral Tablet] 30 tablet 0     Sig: TAKE 1 TABLET BY MOUTH NIGHTLY AS NEEDED FOR SLEEP      Recent Visits  Date Type Provider Dept   01/11/23 Office Visit MD Nereida Jones-Internal Med   07/27/22 [facility]   Showing recent visits within past 540 days with a meds authorizing provider and meeting all other requirements  Future Appointments  No visits were found meeting these conditions.   Showing future appointments within next 150 days with a meds authorizing provider and meeting all other requirements     Requested Prescriptions   Pending Prescriptions Disp Refills    ZOLPIDEM 5 MG Oral Tab [Pharmacy Med Name: Zolpidem Tartrate 5 MG Oral Tablet] 30 tablet 0     Sig: TAKE 1 TABLET BY MOUTH NIGHTLY AS NEEDED FOR SLEEP       There is no refill protocol information for this order            Recent Outpatient Visits              6 months ago Uncontrolled type 2 diabetes mellitus with hyperglycemia [facility])    [address], [address], Josy Castrejon, SHASHA    Office Visit    6 months ago Type 2 diabetes mellitus with other specified complication, without long-term current use of insulin ([address]    [facility]    Office Visit    1 year ago Encounter for annual health examination    [address] [place], [place] [address]Gail MD    Office Visit    2 years ago Chao Heath annual wellness visit, subsequent    [address], Brown Cooper MD    Office Visit    2 years ago Type 2 diabetes mellitus with other specified complication, without long-term current use of insulin [facility])    [address], [address], Quita Pod, MD    Office Visit

## 2023-08-24 ENCOUNTER — TELEPHONE (OUTPATIENT)
Dept: INTERNAL MEDICINE CLINIC | Facility: CLINIC | Age: 68
End: 2023-08-24

## 2023-09-18 ENCOUNTER — TELEPHONE (OUTPATIENT)
Dept: INTERNAL MEDICINE CLINIC | Facility: CLINIC | Age: 68
End: 2023-09-18

## 2023-10-23 RX ORDER — ATORVASTATIN CALCIUM 20 MG/1
TABLET, FILM COATED ORAL
Qty: 90 TABLET | Refills: 3 | Status: SHIPPED | OUTPATIENT
Start: 2023-10-23

## 2023-10-23 NOTE — TELEPHONE ENCOUNTER
Refill passed per [place] [employer] protocol.     Requested Prescriptions   Pending Prescriptions Disp Refills    ATORVASTATIN 20 MG Oral Tab [Pharmacy Med Name: ATORVASTATIN TABS 20MG] 90 tablet 3     Sig: TAKE 1 TABLET DAILY       Cholesterol Medication Protocol Passed - 10/22/2023 11:27 PM        Passed - ALT in past 12 months        Passed - LDL in past 12 months        Passed - Last ALT < 80     Lab Results   Component Value Date    ALT 25 01/12/2023             Passed - Last LDL < 130     Lab Results   Component Value Date    LDL 80 01/12/2023             Passed - In person appointment or virtual visit in the past 12 mos or appointment in next 3 mos     Recent Outpatient Visits              8 months ago Uncontrolled type 2 diabetes mellitus with hyperglycemia [facility])    [address] [facility] Mary Corn[facility]    Office Visit    9 months ago Type 2 diabetes mellitus with other specified complication, without long-term current use of insulin ([address]    [facility] [place] [address] Tom De La Torre MD    Office Visit    1 year ago Encounter for annual health examination    [address] [place] [address] [address], Tom Connors MD    Office Visit    2 years ago Chao Heath annual wellness visit, subsequent    [address] [place] [address] Tom Smith MD    Office Visit    2 years ago Type 2 diabetes mellitus with other specified complication, without long-term current use of insulin ([address]    [address], Tom Connors MD    Office Visit                           Recent Outpatient Visits              8 months ago Uncontrolled type 2 diabetes mellitus with hyperglycemia [facility])    [address] [facility] Corn[facility]    Office Visit    9 months ago Type 2 diabetes mellitus with other specified complication, without long-term current use of insulin [facility])    Mehdi Cm MD    Office Visit    1 year ago Encounter for annual health examination    Mehdi Cm MD    Office Visit    2 years ago Chao Heath annual wellness visit, subsequent    Mehdi Cm MD    Office Visit    2 years ago Type 2 diabetes mellitus with other specified complication, without long-term current use of insulin [facility])    Foreign Atkins MD    Office Visit Statement Selected

## 2024-01-03 ENCOUNTER — TELEPHONE (OUTPATIENT)
Dept: INTERNAL MEDICINE CLINIC | Facility: CLINIC | Age: 69
End: 2024-01-03

## 2024-01-03 RX ORDER — LOSARTAN POTASSIUM 100 MG/1
TABLET ORAL
Qty: 90 TABLET | Refills: 3 | Status: SHIPPED | OUTPATIENT
Start: 2024-01-03

## 2024-01-03 NOTE — TELEPHONE ENCOUNTER
Please review; protocol failed/ no protocol  Medication pended for your review and approval.     Requested Prescriptions   Pending Prescriptions Disp Refills    LOSARTAN 100 MG Oral Tab [Pharmacy Med Name: LOSARTAN TABS 100MG] 90 tablet 3     Sig: TAKE 1 TABLET DAILY       Hypertensive Medications Protocol Failed - 1/3/2024 12:11 AM        Failed - Last BP reading less than 140/90     BP Readings from Last 1 Encounters:   01/30/23 (!) 162/94               Failed - CMP or BMP in past 6 months     No results found for this or any previous visit (from the past 4392 hour(s)).            Failed - In person appointment or virtual visit in the past 6 months     Recent Outpatient Visits              11 months ago Uncontrolled type 2 diabetes mellitus with hyperglycemia (HCC)    [facility] [place] Margaret Anne, [facility]    Office Visit    11 months ago Type 2 diabetes mellitus with other specified complication, without long-term current use of insulin (HCC)    [facility] Maelen[facility]    Office Visit    1 year ago Encounter for annual health examination    [facility] Add[facility] Pantano, Maelen, MD    Office Visit    2 years ago Medicare annual wellness visit, subsequent    [facility] Maelen[facility]    Office Visit    3 years ago Type 2 diabetes mellitus with other specified complication, without long-term current use of insulin (HCC)    [facility] Maelen[facility]    Office Visit                      Passed - In person appointment in the past 12 or next 3 months     Recent Outpatient Visits              11 months ago Uncontrolled type 2 diabetes mellitus with hyperglycemia (HCC)    [facility] [place] Chanel Margaret Anne, APRN    Office Visit    11 months ago Type 2 diabetes mellitus with  other specified complication, without long-term current use of insulin (HCC)    [facility] [place] [address] Pantano, Maelen, MD    Office Visit    1 year ago Encounter for annual health examination    [facility] [place] Pantano, Maelen, MD    Office Visit    2 years ago Medicare annual wellness visit, subsequent    [facility] [place] [address] Pantano, Maelen, MD    Office Visit    3 years ago Type 2 diabetes mellitus with other specified complication, without long-term current use of insulin (HCC)    [facility] [place] Add[place] Pantano, Maelen, MD    Office Visit                      Passed - EGFRCR or GFRNAA > 50     GFR Evaluation  EGFRCR: 53 , resulted on 1/12/2023

## 2024-01-23 ENCOUNTER — TELEPHONE (OUTPATIENT)
Dept: FAMILY MEDICINE CLINIC | Facility: CLINIC | Age: 69
End: 2024-01-23

## 2024-01-31 DIAGNOSIS — G47.00 INSOMNIA, UNSPECIFIED TYPE: Primary | ICD-10-CM

## 2024-02-01 RX ORDER — ZOLPIDEM TARTRATE 5 MG/1
5 TABLET ORAL NIGHTLY PRN
Qty: 30 TABLET | Refills: 0 | Status: SHIPPED | OUTPATIENT
Start: 2024-02-01

## 2024-02-01 RX ORDER — METOPROLOL SUCCINATE 50 MG/1
50 TABLET, EXTENDED RELEASE ORAL DAILY
Qty: 30 TABLET | Refills: 0 | Status: SHIPPED | OUTPATIENT
Start: 2024-02-01

## 2024-02-01 NOTE — TELEPHONE ENCOUNTER
Please review; protocol failed/ has no protocol    No active /future labs noted   Message sent for patient to make an appointment.     Requested Prescriptions   Pending Prescriptions Disp Refills    METOPROLOL SUCCINATE ER 50 MG Oral Tablet 24 Hr [Pharmacy Med Name: Metoprolol Succinate ER 50 MG Oral Tablet Extended Release 24 Hour] 90 tablet 0     Sig: Take 1 tablet by mouth once daily       Hypertensive Medications Protocol Failed - 1/31/2024  3:28 PM        Failed - In person appointment in the past 12 or next 3 months     Recent Outpatient Visits              1 year ago Uncontrolled type 2 diabetes mellitus with hyperglycemia ([facility])    [facility] [facility]    Office Visit    1 year ago Type 2 diabetes mellitus with other specified complication, without long-term current use of insulin ([facility])    [facility] Maelen[facility]    Office Visit    1 year ago Encounter for annual health examination    [facility]    Office Visit    2 years ago Medicare annual wellness visit, subsequent    [facility] Maelen[facility]    Office Visit    3 years ago Type 2 diabetes mellitus with other specified complication, without long-term current use of insulin ([facility])    [facility] Maelen[facility]    Office Visit                      Failed - Last BP reading less than 140/90     BP Readings from Last 1 Encounters:   01/30/23 (!) 162/94               Failed - CMP or BMP in past 6 months     No results found for this or any previous visit (from the past 4392 hour(s)).            Failed - In person appointment or virtual visit in the past 6 months     Recent Outpatient Visits              1 year ago Uncontrolled type 2 diabetes mellitus with hyperglycemia ([facility])    [facility]  Cely Rhodes Margaret Anne, APRN    Office Visit    1 year ago Type 2 diabetes mellitus with other specified complication, without long-term current use of insulin (HCC)    [facility]leg[facility] Addison Pantano, Maelen, MD    Office Visit    1 year ago Encounter for annual health examination    [facility] Galleg[facility] Addison Pantano, Maelen, MD    Office Visit    2 years ago Medicare annual wellness visit, subsequent    [facility] Jeffry Gallegos Addison Pantano, Maelen, MD    Office Visit    3 years ago Type 2 diabetes mellitus with other specified complication, without long-term current use of insulin (HCC)    [facility] Addison Pantano, Maelen, MD    Office Visit                      Failed - EGFRCR or GFRNAA > 50     GFR Evaluation              ZOLPIDEM 5 MG Oral Tab [Pharmacy Med Name: Zolpidem Tartrate 5 MG Oral Tablet] 30 tablet 0     Sig: TAKE 1 TABLET BY MOUTH NIGHTLY AS NEEDED FOR SLEEP       There is no refill protocol information for this order        Recent Outpatient Visits              1 year ago Uncontrolled type 2 diabetes mellitus with hyperglycemia ([facility])    [facility] [address], [place] Valent[place], Chasity Kizzy, SHASHA    Office Visit    1 year ago Type 2 diabetes mellitus with other specified complication, without long-term current use of insulin ([facility])    [facility] M[facility]en[facility]    Office Visit    1 year ago Encounter for annual health examination    [facility] Gallegos Addison Pantano, Maelen, MD    Office Visit    2 years ago Medicare annual wellness visit, subsequent    [facility] Gallegos Addison Pantano, Maelen, MD    Office Visit    3 years ago Type 2 diabetes mellitus with other specified complication, without long-term current use of insulin (HCC)    [facility], [facility] Haily Rivera MD    Office Visit

## 2024-02-01 NOTE — TELEPHONE ENCOUNTER
Talked to Leandra wife of patient appointment made but per wife of patient, patient needs his rx med.

## 2024-03-13 ENCOUNTER — NURSE ONLY (OUTPATIENT)
Dept: INTERNAL MEDICINE CLINIC | Facility: CLINIC | Age: 69
End: 2024-03-13

## 2024-03-13 ENCOUNTER — OFFICE VISIT (OUTPATIENT)
Dept: INTERNAL MEDICINE CLINIC | Facility: CLINIC | Age: 69
End: 2024-03-13

## 2024-03-13 ENCOUNTER — LAB ENCOUNTER (OUTPATIENT)
Dept: LAB | Age: 69
End: 2024-03-13
Attending: INTERNAL MEDICINE
Payer: MEDICARE

## 2024-03-13 VITALS
DIASTOLIC BLOOD PRESSURE: 95 MMHG | HEIGHT: 62 IN | SYSTOLIC BLOOD PRESSURE: 166 MMHG | BODY MASS INDEX: 26.68 KG/M2 | WEIGHT: 145 LBS | HEART RATE: 112 BPM

## 2024-03-13 DIAGNOSIS — E11.69 TYPE 2 DIABETES MELLITUS WITH OTHER SPECIFIED COMPLICATION, WITHOUT LONG-TERM CURRENT USE OF INSULIN (HCC): ICD-10-CM

## 2024-03-13 DIAGNOSIS — E78.5 HYPERLIPIDEMIA, UNSPECIFIED HYPERLIPIDEMIA TYPE: ICD-10-CM

## 2024-03-13 DIAGNOSIS — G47.00 INSOMNIA, UNSPECIFIED TYPE: ICD-10-CM

## 2024-03-13 DIAGNOSIS — Z00.00 ENCOUNTER FOR ANNUAL HEALTH EXAMINATION: ICD-10-CM

## 2024-03-13 DIAGNOSIS — Z12.5 SCREENING FOR PROSTATE CANCER: ICD-10-CM

## 2024-03-13 DIAGNOSIS — Z00.00 MEDICARE ANNUAL WELLNESS VISIT, SUBSEQUENT: Primary | ICD-10-CM

## 2024-03-13 DIAGNOSIS — Z12.11 COLON CANCER SCREENING: ICD-10-CM

## 2024-03-13 DIAGNOSIS — I10 ESSENTIAL HYPERTENSION: ICD-10-CM

## 2024-03-13 DIAGNOSIS — E55.9 VITAMIN D DEFICIENCY: ICD-10-CM

## 2024-03-13 LAB
ALBUMIN SERPL-MCNC: 5 G/DL (ref 3.2–4.8)
ALBUMIN/GLOB SERPL: 1.7 {RATIO} (ref 1–2)
ALP LIVER SERPL-CCNC: 67 U/L
ALT SERPL-CCNC: 52 U/L
ANION GAP SERPL CALC-SCNC: 9 MMOL/L (ref 0–18)
AST SERPL-CCNC: 37 U/L (ref ?–34)
BASOPHILS # BLD AUTO: 0.07 X10(3) UL (ref 0–0.2)
BASOPHILS NFR BLD AUTO: 0.8 %
BILIRUB SERPL-MCNC: 1 MG/DL (ref 0.2–1.1)
BILIRUB UR QL: NEGATIVE
BUN BLD-MCNC: 17 MG/DL (ref 9–23)
BUN/CREAT SERPL: 10.4 (ref 10–20)
CALCIUM BLD-MCNC: 11 MG/DL (ref 8.7–10.4)
CHLORIDE SERPL-SCNC: 103 MMOL/L (ref 98–112)
CHOLEST SERPL-MCNC: 151 MG/DL (ref ?–200)
CLARITY UR: CLEAR
CO2 SERPL-SCNC: 26 MMOL/L (ref 21–32)
COMPLEXED PSA SERPL-MCNC: 2.57 NG/ML (ref ?–4)
CREAT BLD-MCNC: 1.63 MG/DL
CREAT UR-SCNC: 59.9 MG/DL
DEPRECATED RDW RBC AUTO: 42.6 FL (ref 35.1–46.3)
EGFRCR SERPLBLD CKD-EPI 2021: 45 ML/MIN/1.73M2 (ref 60–?)
EOSINOPHIL # BLD AUTO: 0.83 X10(3) UL (ref 0–0.7)
EOSINOPHIL NFR BLD AUTO: 9.7 %
ERYTHROCYTE [DISTWIDTH] IN BLOOD BY AUTOMATED COUNT: 13.1 % (ref 11–15)
EST. AVERAGE GLUCOSE BLD GHB EST-MCNC: 237 MG/DL (ref 68–126)
FASTING PATIENT LIPID ANSWER: NO
FASTING STATUS PATIENT QL REPORTED: NO
GLOBULIN PLAS-MCNC: 2.9 G/DL (ref 2.8–4.4)
GLUCOSE BLD-MCNC: 192 MG/DL (ref 70–99)
GLUCOSE UR-MCNC: >1000 MG/DL
HBA1C MFR BLD: 9.9 % (ref ?–5.7)
HCT VFR BLD AUTO: 49.3 %
HDLC SERPL-MCNC: 40 MG/DL (ref 40–59)
HGB BLD-MCNC: 16.1 G/DL
HGB UR QL STRIP.AUTO: NEGATIVE
IMM GRANULOCYTES # BLD AUTO: 0.02 X10(3) UL (ref 0–1)
IMM GRANULOCYTES NFR BLD: 0.2 %
KETONES UR-MCNC: NEGATIVE MG/DL
LDLC SERPL CALC-MCNC: 83 MG/DL (ref ?–100)
LEUKOCYTE ESTERASE UR QL STRIP.AUTO: NEGATIVE
LYMPHOCYTES # BLD AUTO: 3.42 X10(3) UL (ref 1–4)
LYMPHOCYTES NFR BLD AUTO: 39.8 %
MCH RBC QN AUTO: 29.1 PG (ref 26–34)
MCHC RBC AUTO-ENTMCNC: 32.7 G/DL (ref 31–37)
MCV RBC AUTO: 89.2 FL
MICROALBUMIN UR-MCNC: 18.6 MG/DL
MICROALBUMIN/CREAT 24H UR-RTO: 310.5 UG/MG (ref ?–30)
MONOCYTES # BLD AUTO: 0.67 X10(3) UL (ref 0.1–1)
MONOCYTES NFR BLD AUTO: 7.8 %
NEUTROPHILS # BLD AUTO: 3.59 X10 (3) UL (ref 1.5–7.7)
NEUTROPHILS # BLD AUTO: 3.59 X10(3) UL (ref 1.5–7.7)
NEUTROPHILS NFR BLD AUTO: 41.7 %
NITRITE UR QL STRIP.AUTO: NEGATIVE
NONHDLC SERPL-MCNC: 111 MG/DL (ref ?–130)
OSMOLALITY SERPL CALC.SUM OF ELEC: 293 MOSM/KG (ref 275–295)
PH UR: 5 [PH] (ref 5–8)
PLATELET # BLD AUTO: 171 10(3)UL (ref 150–450)
POTASSIUM SERPL-SCNC: 4.8 MMOL/L (ref 3.5–5.1)
PROT SERPL-MCNC: 7.9 G/DL (ref 5.7–8.2)
PROT UR-MCNC: 30 MG/DL
RBC # BLD AUTO: 5.53 X10(6)UL
SODIUM SERPL-SCNC: 138 MMOL/L (ref 136–145)
SP GR UR STRIP: 1.01 (ref 1–1.03)
T4 FREE SERPL-MCNC: 1.5 NG/DL (ref 0.8–1.7)
TRIGL SERPL-MCNC: 162 MG/DL (ref 30–149)
TSI SER-ACNC: 0.44 MIU/ML (ref 0.55–4.78)
UROBILINOGEN UR STRIP-ACNC: NORMAL
VLDLC SERPL CALC-MCNC: 26 MG/DL (ref 0–30)
WBC # BLD AUTO: 8.6 X10(3) UL (ref 4–11)

## 2024-03-13 PROCEDURE — 80053 COMPREHEN METABOLIC PANEL: CPT

## 2024-03-13 PROCEDURE — 82043 UR ALBUMIN QUANTITATIVE: CPT

## 2024-03-13 PROCEDURE — 84443 ASSAY THYROID STIM HORMONE: CPT

## 2024-03-13 PROCEDURE — 82570 ASSAY OF URINE CREATININE: CPT

## 2024-03-13 PROCEDURE — 80061 LIPID PANEL: CPT

## 2024-03-13 PROCEDURE — 83036 HEMOGLOBIN GLYCOSYLATED A1C: CPT

## 2024-03-13 PROCEDURE — 36415 COLL VENOUS BLD VENIPUNCTURE: CPT

## 2024-03-13 PROCEDURE — 84439 ASSAY OF FREE THYROXINE: CPT

## 2024-03-13 PROCEDURE — 85025 COMPLETE CBC W/AUTO DIFF WBC: CPT

## 2024-03-13 PROCEDURE — 81001 URINALYSIS AUTO W/SCOPE: CPT

## 2024-03-13 RX ORDER — METOPROLOL SUCCINATE 50 MG/1
50 TABLET, EXTENDED RELEASE ORAL DAILY
Qty: 90 TABLET | Refills: 1 | Status: SHIPPED | OUTPATIENT
Start: 2024-03-13 | End: 2024-03-13

## 2024-03-13 RX ORDER — METOPROLOL SUCCINATE 50 MG/1
50 TABLET, EXTENDED RELEASE ORAL DAILY
Qty: 90 TABLET | Refills: 1 | Status: SHIPPED | OUTPATIENT
Start: 2024-03-13

## 2024-03-13 RX ORDER — ZOLPIDEM TARTRATE 5 MG/1
5 TABLET ORAL NIGHTLY PRN
Qty: 30 TABLET | Refills: 0 | Status: SHIPPED | OUTPATIENT
Start: 2024-03-13

## 2024-03-13 RX ORDER — ZOLPIDEM TARTRATE 5 MG/1
5 TABLET ORAL NIGHTLY PRN
Qty: 30 TABLET | Refills: 0 | Status: SHIPPED | OUTPATIENT
Start: 2024-03-13 | End: 2024-03-13

## 2024-03-13 NOTE — PROGRESS NOTES
Patient is here for DM retina camera eye exam. Verified full name, , and active order for diabetic retinopathy exam OU. DM eye exam completed. Patient advised of PCP will review eye exam results. Patient verbalized understanding.

## 2024-03-13 NOTE — PATIENT INSTRUCTIONS
Howard Schmid's SCREENING SCHEDULE   Tests on this list are recommended by your physician but may not be covered, or covered at this frequency, by your insurer.   Please check with your insurance carrier before scheduling to verify coverage.   PREVENTATIVE SERVICES FREQUENCY &  COVERAGE DETAILS LAST COMPLETION DATE   Diabetes Screening    Fasting Blood Sugar / Glucose    One screening every 12 months if never tested or if previously tested but not diagnosed with pre-diabetes   One screening every 6 months if diagnosed with pre-diabetes Lab Results   Component Value Date     (H) 03/13/2024        Cardiovascular Disease Screening    Lipid Panel  Cholesterol  Lipoprotein (HDL)  Triglycerides Covered every 5 years for all Medicare beneficiaries without apparent signs or symptoms of cardiovascular disease Lab Results   Component Value Date    CHOLEST 151 03/13/2024    HDL 40 03/13/2024    LDL 83 03/13/2024    TRIG 162 (H) 03/13/2024         Electrocardiogram (EKG)   Covered if needed at [facility] to Medicare, and non-screening if indicated for medical reasons 07/28/2022      Ultrasound Screening for Abdominal Aortic Aneurysm (AAA) Covered once in a lifetime for one of the following risk factors   • Men who are 65-75 years old and have ever smoked   • Anyone with a family history -     Colorectal Cancer Screening  Covered for ages 50-85; only need ONE of the following:    Colonoscopy   Covered every 10 years    Covered every 2 years if patient is at high risk or previous colonoscopy was abnormal -    Health Maintenance   Topic Date Due   • Colorectal Cancer Screening  11/06/2021       Flexible Sigmoidoscopy   Covered every 4 years -    Fecal Occult Blood Test Covered annually -   Prostate Cancer Screening    Prostate-Specific Antigen (PSA) Annually Lab Results   Component Value Date    PSA 3.0 08/07/2018     Health Maintenance   Topic Date Due   • PSA  03/13/2026      Immunizations    Influenza Covered once per flu  season  Please get every year -  Influenza Vaccine(1) due on 10/01/2023    Pneumococcal Each vaccine ([id number] & [id number]) covered once after 65 Prevnar 13: 04/13/2015    Uiqceufir28: 01/22/2020     No recommendations at this time    Hepatitis B One screening covered for patients with certain risk factors   -  No recommendations at this time    Tetanus Toxoid Not covered by Medicare Part B unless medically necessary (cut with metal); may be covered with your pharmacy prescription benefits -    Tetanus, Diptheria and Pertusis TD and TDaP Not covered by Medicare Part B -  No recommendations at this time    Zoster Not covered by Medicare Part B; may be covered with your pharmacy  prescription benefits -  Zoster Vaccines(1 of 2) Never done     Diabetes      Hemoglobin A1C Annually; if last result is elevated, may be asked to retest more frequently.    Medicare covers every 3 months Lab Results   Component Value Date     (H) 03/13/2024    A1C 9.9 (H) 03/13/2024       No recommendations at this time    Creat/alb ratio Annually Lab Results   Component Value Date    MICROALBCREA 310.5 (H) 03/13/2024       LDL Annually Lab Results   Component Value Date    LDL 83 03/13/2024       Dilated Eye Exam Annually Last Diabetic Eye Exam:  No data recorded  No data recorded       Annual Monitoring of Persistent Medications (ACE/ARB, digoxin diuretics, anticonvulsants)    Potassium Annually Lab Results   Component Value Date    K 4.8 03/13/2024         Creatinine   Annually Lab Results   Component Value Date    CREATSERUM 1.63 (H) 03/13/2024         BUN Annually Lab Results   Component Value Date    BUN 17 03/13/2024       Drug Serum Conc Annually No results found for: \"DIGOXIN\", \"DIG\", \"VALP\"         Howard S Sulit's SCREENING SCHEDULE   Tests on this list are recommended by your physician but may not be covered, or covered at this frequency, by your insurer.   Please check with your insurance carrier before scheduling to  verify coverage.   PREVENTATIVE SERVICES FREQUENCY &  COVERAGE DETAILS LAST COMPLETION DATE   Diabetes Screening    Fasting Blood Sugar / Glucose    One screening every 12 months if never tested or if previously tested but not diagnosed with pre-diabetes   One screening every 6 months if diagnosed with pre-diabetes Lab Results   Component Value Date     (H) 03/13/2024        Cardiovascular Disease Screening    Lipid Panel  Cholesterol  Lipoprotein (HDL)  Triglycerides Covered every 5 years for all Medicare beneficiaries without apparent signs or symptoms of cardiovascular disease Lab Results   Component Value Date    CHOLEST 151 03/13/2024    HDL 40 03/13/2024    LDL 83 03/13/2024    TRIG 162 (H) 03/13/2024         Electrocardiogram (EKG)   Covered if needed at [facility] to Medicare, and non-screening if indicated for medical reasons 07/28/2022      Ultrasound Screening for Abdominal Aortic Aneurysm (AAA) Covered once in a lifetime for one of the following risk factors   • Men who are 65-75 years old and have ever smoked   • Anyone with a family history -     Colorectal Cancer Screening  Covered for ages 50-85; only need ONE of the following:    Colonoscopy   Covered every 10 years    Covered every 2 years if patient is at high risk or previous colonoscopy was abnormal -    Health Maintenance   Topic Date Due   • Colorectal Cancer Screening  11/06/2021       Flexible Sigmoidoscopy   Covered every 4 years -    Fecal Occult Blood Test Covered annually -   Prostate Cancer Screening    Prostate-Specific Antigen (PSA) Annually Lab Results   Component Value Date    PSA 3.0 08/07/2018     Health Maintenance   Topic Date Due   • PSA  03/13/2026      Immunizations    Influenza Covered once per flu season  Please get every year -  Influenza Vaccine(1) due on 10/01/2023    Pneumococcal Each vaccine (Xdkmful52 & [id number]) covered once after 65 Prevnar 13: 04/13/2015    [id number]: 01/22/2020     No recommendations at  this time    Hepatitis B One screening covered for patients with certain risk factors   -  No recommendations at this time    Tetanus Toxoid Not covered by Medicare Part B unless medically necessary (cut with metal); may be covered with your pharmacy prescription benefits -    Tetanus, Diptheria and Pertusis TD and TDaP Not covered by Medicare Part B -  No recommendations at this time    Zoster Not covered by Medicare Part B; may be covered with your pharmacy  prescription benefits -  Zoster Vaccines(1 of 2) Never done     Diabetes      Hemoglobin A1C Annually; if last result is elevated, may be asked to retest more frequently.    Medicare covers every 3 months Lab Results   Component Value Date     (H) 03/13/2024    A1C 9.9 (H) 03/13/2024       No recommendations at this time    Creat/alb ratio Annually Lab Results   Component Value Date    MICROALBCREA 310.5 (H) 03/13/2024       LDL Annually Lab Results   Component Value Date    LDL 83 03/13/2024       Dilated Eye Exam Annually Last Diabetic Eye Exam:  No data recorded  No data recorded       Annual Monitoring of Persistent Medications (ACE/ARB, digoxin diuretics, anticonvulsants)    Potassium Annually Lab Results   Component Value Date    K 4.8 03/13/2024         Creatinine   Annually Lab Results   Component Value Date    CREATSERUM 1.63 (H) 03/13/2024         BUN Annually Lab Results   Component Value Date    BUN 17 03/13/2024       Drug Serum Conc Annually No results found for: \"DIGOXIN\", \"DIG\", \"VALP\"

## 2024-03-13 NOTE — PROGRESS NOTES
HPI:    Patient ID: Howard Schmid is a 68 year old male.    HPI    BP (!) 166/95 (BP Location: Right arm, Patient Position: Sitting, Cuff Size: adult)   Pulse 112   Ht 5' 2\" (1.575 m)   Wt 145 lb (65.8 kg)   BMI 26.52 kg/m²   Wt Readings from Last 6 Encounters:   03/13/24 145 lb (65.8 kg)   01/30/23 141 lb (64 kg)   01/11/23 141 lb (64 kg)   07/27/22 141 lb (64 kg)   06/23/21 146 lb (66.2 kg)   11/04/20 145 lb (65.8 kg)     Body mass index is 26.52 kg/m².  HGBA1C:    Lab Results   Component Value Date    A1C 7.9 (H) 01/12/2023    A1C 7.4 (H) 08/05/2022    A1C 8.3 (H) 06/24/2021     (H) 01/12/2023         Review of Systems      Current Outpatient Medications   Medication Sig Dispense Refill    metoprolol succinate ER 50 MG Oral Tablet 24 Hr Take 1 tablet (50 mg total) by mouth daily. 90 tablet 1    zolpidem 5 MG Oral Tab Take 1 tablet (5 mg total) by mouth nightly as needed for Sleep. 30 tablet 0    losartan 100 MG Oral Tab TAKE 1 TABLET DAILY 90 tablet 3    atorvastatin 20 MG Oral Tab TAKE 1 TABLET DAILY 90 tablet 3    metFORMIN  MG Oral Tablet 24 Hr Take 2 tablets (1,000 mg total) by mouth 2 (two) times daily with meals. 360 tablet 3    Glucose Blood (FREESTYLE LITE TEST) In Vitro Strip Test once daily . 100 strip 3    FreeStyle Lancets Does not apply Misc Test once daily . 100 each 3    Blood Glucose Monitoring Suppl (FREESTYLE LITE) Does not apply Device Test once daily . 1 Device 0    aspirin 81 MG Oral Tab EC Take  by mouth.       Allergies:  Allergies   Allergen Reactions    Chicken ITCHING    Shrimp ITCHING       HISTORY:  Past Medical History:   Diagnosis Date    Calculus of kidney 2015    NO SURGERY    Cup to disc asymmetry 2007    Cup Disc Asymmetry, OU    Diabetes (HCC)     High cholesterol     Hypertension     Lipid screening 02-    per [facility]    Type 2 diabetes mellitus (HCC)     Visual impairment     READING GLASSES      Past Surgical History:   Procedure Laterality Date     EXCIS PTERYGIUM      per [facility]:  \"Pterygium excision\"      Family History   Problem Relation Age of Onset    Diabetes Cousin     No Known Problems Son     Glaucoma Neg         No glaucoma history      Social History:   Social History     Socioeconomic History    Marital status:    Tobacco Use    Smoking status: Former     Passive exposure: Past    Smokeless tobacco: Never   Substance and Sexual Activity    Alcohol use: No     Comment: (Beer) 3 beers occasionally    Drug use: No   Other Topics Concern    Caffeine Concern Yes     Comment: Coffee, 1-2 cups daily        PHYSICAL EXAM:    Physical Exam         ASSESSMENT/PLAN:   (Z00.00) Encounter for annual health examination  (primary encounter diagnosis)  Plan: ***    (G47.00) Insomnia, unspecified type  Plan: zolpidem 5 MG Oral Tab        ***       No orders of the defined types were placed in this encounter.      Meds This Visit:  Requested Prescriptions     Signed Prescriptions Disp Refills    metoprolol succinate ER 50 MG Oral Tablet 24 Hr 90 tablet 1     Sig: Take 1 tablet (50 mg total) by mouth daily.    zolpidem 5 MG Oral Tab 30 tablet 0     Sig: Take 1 tablet (5 mg total) by mouth nightly as needed for Sleep.       Imaging & Referrals:  None        ID#[id number]

## 2024-03-14 ENCOUNTER — TELEPHONE (OUTPATIENT)
Dept: INTERNAL MEDICINE CLINIC | Facility: CLINIC | Age: 69
End: 2024-03-14

## 2024-03-14 NOTE — TELEPHONE ENCOUNTER
Email sent to Tanisha Macario, . Please see procedure Diabetic Retinopathy Exam from 3/13/24 and cancel charges per Dr. Rivera.       Haily Rivera MD  3/13/2024  6:26 PM CDT Back to Top      Send  Letter and copy of test result.  MY PATIENT DID NOT BENEFIT FROM THIS EXAM PLEASE CANCEL CHARGES  SEND TO BILLING

## 2024-03-19 NOTE — PROGRESS NOTES
Subjective:   Howard Schmid is a 68 year old male who presents for a Medicare Subsequent Annual Wellness visit (Pt already had Initial Annual Wellness) and scheduled follow up of multiple significant but stable problems.   Last office visit 2/1/23   Need compliance with regular followu p  Pt has diabetes  hypertension hyperlipedemia  Overdue for blood work  Overdue for eye exam last was more than a year ago  Pt anxious in the doctor.s office  HTN  Long standing history of hypertension     sympotms  :        Headache no  dizziness        no                             Blurred vision no  palpitaionsSyncope no  Chest pain  no  PND  Orthopnea no  Weakness  No  Low salt diet    most of the time    Compliance with exercise stays active  Compliance with medication yes                                              History/Other:   Fall Risk Assessment:   He has been screened for Falls and is low risk.      Cognitive Assessment:   He had a completely normal cognitive assessment - see flowsheet entries     Functional Ability/Status:   Howard Schmid has some abnormal functions as listed below:  He has Vision problems based on screening of functional status.       Depression Screening (PHQ-2/PHQ-9): PHQ-2 SCORE: 0  , done 3/13/2024            Advanced Directives:   He does NOT have a Living Will. [Do you have a living will?: No]  He does NOT have a Power of  for Health Care. [Do you have a healthcare power of ?: No]  Discussed Advance Care Planning with patient (and family/surrogate if present). Standard forms made available to patient in After Visit Summary.      Patient Active Problem List   Diagnosis    Hyperlipidemia    Senile cataract    Vitamin D deficiency    Type II diabetes mellitus (HCC)    Essential hypertension    Atherosclerosis of abdominal aorta (HCC)    CKD (chronic kidney disease) stage 3, GFR 30-59 ml/min ([facility])    Preglaucoma     Allergies:  He is allergic to chicken and shrimp.    Current  Medications:  Outpatient Medications Marked as Taking for the 3/13/24 encounter (Office Visit) with Haily Rivera MD   Medication Sig    zolpidem 5 MG Oral Tab Take 1 tablet (5 mg total) by mouth nightly as needed for Sleep.    metoprolol succinate ER 50 MG Oral Tablet 24 Hr Take 1 tablet (50 mg total) by mouth daily.    losartan 100 MG Oral Tab TAKE 1 TABLET DAILY    atorvastatin 20 MG Oral Tab TAKE 1 TABLET DAILY    metFORMIN  MG Oral Tablet 24 Hr Take 2 tablets (1,000 mg total) by mouth 2 (two) times daily with meals.    Glucose Blood (FREESTYLE LITE TEST) In Vitro Strip Test once daily .    FreeStyle Lancets Does not apply Misc Test once daily .    Blood Glucose Monitoring Suppl (FREESTYLE LITE) Does not apply Device Test once daily .    aspirin 81 MG Oral Tab EC Take  by mouth.       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  Surgical History:  He  has a past surgical history that includes excis pterygium.   Family History:  His family history includes Diabetes in his cousin; No Known Problems in his son.  Social History:  He  reports that he has quit smoking. He has been exposed to tobacco smoke. He has never used smokeless tobacco. He reports that he does not drink alcohol and does not use drugs.    Tobacco:  He smoked tobacco in the past but quit greater than 12 months ago.  Social History    Tobacco Use      Smoking status: Former        Passive exposure: Past      Smokeless tobacco: Never       CAGE Alcohol Screen:   CAGE screening score of 0 on 3/13/2024, showing low risk of alcohol abuse.      Patient Care Team:  Haily Rivera MD as PCP - General (Internal Medicine)    Review of Systems   Constitutional: Negative.  Negative for appetite change, chills, fatigue and fever.   HENT:  Negative for  congestion, ear discharge, ear pain, rhinorrhea, sinus pressure, sneezing, sore throat, trouble swallowing and voice change.    Eyes:  Negative for pain and discharge.   Respiratory:  Negative for cough, chest tightness, shortness of breath and wheezing.    Cardiovascular:  Negative for chest pain, palpitations and leg swelling.   Gastrointestinal:  Negative for abdominal distention, abdominal pain, blood in stool, constipation, diarrhea, nausea and vomiting.   Endocrine: Negative for cold intolerance and heat intolerance.   Genitourinary:  Negative for decreased urine volume, difficulty urinating, dysuria, frequency, hematuria and urgency.   Musculoskeletal:  Negative for arthralgias, back pain, gait problem and joint swelling.   Skin:  Negative for rash.   Allergic/Immunologic: Negative for environmental allergies and food allergies.   Neurological:  Negative for dizziness, tremors, seizures, weakness, light-headedness and headaches.   Hematological:  Negative for adenopathy. Does not bruise/bleed easily.   Psychiatric/Behavioral:  Negative for behavioral problems and confusion.          Objective:   Physical Exam  Constitutional:       Appearance: He is not ill-appearing.   HENT:      Right Ear: Ear canal normal.      Left Ear: Ear canal normal.      Mouth/Throat:      Pharynx: Oropharynx is clear.   Eyes:      Extraocular Movements: Extraocular movements intact.      Conjunctiva/sclera: Conjunctivae normal.      Pupils: Pupils are equal, round, and reactive to light.   Neck:      Vascular: No carotid bruit.   Cardiovascular:      Rate and Rhythm: Normal rate and regular rhythm.   Pulmonary:      Effort: No respiratory distress.      Breath sounds: Normal breath sounds. No wheezing.   Abdominal:      General: Bowel sounds are normal.      Palpations: Abdomen is soft.   Musculoskeletal:      Right lower leg: No edema.      Left lower leg: No edema.   Lymphadenopathy:      Cervical: No cervical adenopathy.    Skin:     General: Skin is warm and dry.   Neurological:      Mental Status: He is alert.   Psychiatric:         Mood and Affect: Mood normal.         BP (!) 166/95 (BP Location: Right arm, Patient Position: Sitting, Cuff Size: adult)   Pulse 112   Ht 5' 2\" (1.575 m)   Wt 145 lb (65.8 kg)   BMI 26.52 kg/m²  Estimated body mass index is 26.52 kg/m² as calculated from the following:    Height as of this encounter: 5' 2\" (1.575 m).    Weight as of this encounter: 145 lb (65.8 kg).    Medicare Hearing Assessment:   Hearing Screening    Entry User: Shy Sanz CMA  Screening Method: Questionnaire  I have a problem hearing over the telephone: No I have trouble following the conversations when two or more people are talking at the same time: No   I have trouble understanding things on the TV: No I have to strain to understand conversations: No   I have to worry about missing the telephone ring or doorbell: No I have trouble hearing conversations in a noisy background such as a crowded room or restaurant: No   I get confused about where sounds come from: No I misunderstand some words in a sentence and need to ask people to repeat themselves: No   I especially have trouble understanding the speech of women and children: No I have trouble understanding the speaker in a large room such as at a meeting or place of [employer]: No   Many people I talk to seem to mumble (or don't speak clearly): No People get annoyed because I misunderstand what they say: No   I misunderstand what others are saying and make inappropriate responses: No I avoid social activities because I cannot hear well and fear I will reply improperly: No   Family members and friends have told me they think I may have hearing loss: No                 Assessment & Plan:   Howard Schmid is a 68 year old male who presents for a Medicare Assessment.   (Z00.00) Medicare annual wellness visit, subsequent  (primary encounter diagnosis)  Plan:Patient is  independent with [facility].s    Health screening   Colonoscopy, prostate ca screen  And routine eye exam advised.      (G47.00) Insomnia, unspecified type  Plan: zolpidem 5 MG Oral Tab, DISCONTINUED: zolpidem         5 MG Oral Tab        Refill zoldipem tolerating med without any side effect    (E11.69) Type 2 diabetes mellitus with other specified complication, without long-term current use of insulin (HCC)  Plan: Hemoglobin A1C, Microalb/Creat Ratio, Random         Urine, Diabetic Retinopathy Exam  OU - Both         Eyes        Labs ordered  Eye exam duet  retianal exam  unable to complete  Pt given a referral to see ophthalmology  Bilateral barefoot skin diabetic exam is normal, visualized feet and the appearance is normal.  Bilateral monofilament/sensation of both feet is normal.  Pulsation pedal pulse exam of both lower legs/feet is normal as well.       (I10) Essential hypertension  Plan: Urinalysis, Routine    \BP (!) 166/95 (BP Location: Right arm, Patient Position: Sitting, Cuff Size: adult)   Pulse 112   Ht 5' 2\" (1.575 m)   Wt 145 lb (65.8 kg)   BMI 26.52 kg/m²   With white coat HTN   Pt with HTN and tachycardiac  Low salt diet advised  Monitor blood pressure daily and record  Follow up in a month      (E55.9) Vitamin D deficiency  Plan: supplement    (E78.5) Hyperlipidemia, unspecified hyperlipidemia type  Plan: CBC With Differential With Platelet, Comp         Metabolic Panel (14), Lipid Panel, TSH and Free        T4        Low cholesterol diet advised  Avoid saturated and trans fats       (Z12.11) Colon cancer screening  Plan: Asymptomatic  monitor      (Z12.5) Screening for prostate cancer  Plan: PSA Total, Screen        Asymptomatic  monitor    Medications and most recent test results reviewed  Refill medicaitons  as needed  Potential side effect discussed  Modification of risk for CAD advised    Dietary an lifestyle change  Pt voiced understanding and agrees with plan  Pt given time to ask questions   After Visit Summary handout    Discussed  And given to patient.          The patient indicates understanding of these issues and agrees to the plan.  Reinforced healthy diet, lifestyle, and exercise.      No follow-ups on file.     Haily Rivera MD, 3/18/2024     Supplementary Documentation:   General Health:  In the past six months, have you lost more than 10 pounds without trying?: 2 - No  Has your appetite been poor?: No  Type of Diet: Diabetic  How does the patient maintain a good energy level?: Appropriate Exercise  How would you describe your daily physical activity?: Moderate  How would you describe your current health state?: Fair  How do you maintain positive mental well-being?: Social Interaction  On a scale of 0 to 10, with 0 being no pain and 10 being severe pain, what is your pain level?: 0 - (None)  In the past six months, have you experienced urine leakage?: 0-No  At any time do you feel concerned for the safety/well-being of yourself and/or your children, in your home or elsewhere?: No  Have you had any immunizations at another office such as Influenza, Hepatitis B, Tetanus, or Pneumococcal?: No        Howard Schmid's SCREENING SCHEDULE   Tests on this list are recommended by your physician but may not be covered, or covered at this frequency, by your insurer.   Please check with your insurance carrier before scheduling to verify coverage.   PREVENTATIVE SERVICES FREQUENCY &  COVERAGE DETAILS LAST COMPLETION DATE   Diabetes Screening    Fasting Blood Sugar / Glucose    One screening every 12 months if never tested or if previously tested but not diagnosed with pre-diabetes   One screening every 6 months if diagnosed with pre-diabetes Lab Results   Component Value Date     (H) 03/13/2024        Cardiovascular Disease Screening    Lipid Panel  Cholesterol  Lipoprotein (HDL)  Triglycerides Covered every 5 years for all Medicare beneficiaries without apparent signs or symptoms of  cardiovascular disease Lab Results   Component Value Date    CHOLEST 151 03/13/2024    HDL 40 03/13/2024    LDL 83 03/13/2024    TRIG 162 (H) 03/13/2024         Electrocardiogram (EKG)   Covered if needed at Welcome to Medicare, and non-screening if indicated for medical reasons 07/28/2022      Ultrasound Screening for Abdominal Aortic Aneurysm (AAA) Covered once in a lifetime for one of the following risk factors    Men who are 65-75 years old and have ever smoked    Anyone with a family history -     Colorectal Cancer Screening  Covered for ages 50-85; only need ONE of the following:    Colonoscopy   Covered every 10 years    Covered every 2 years if patient is at high risk or previous colonoscopy was abnormal -    Health Maintenance   Topic Date Due    Colorectal Cancer Screening  11/06/2021       Flexible Sigmoidoscopy   Covered every 4 years -    Fecal Occult Blood Test Covered annually -   Prostate Cancer Screening    Prostate-Specific Antigen (PSA) Annually Lab Results   Component Value Date    PSA 3.0 08/07/2018     Health Maintenance   Topic Date Due    PSA  03/13/2026      Immunizations    Influenza Covered once per flu season  Please get every year -  Influenza Vaccine(1) due on 10/01/2023    Pneumococcal Each vaccine ([id number] & [id number]) covered once after 65 Prevnar 13: 04/13/2015    [id number]: 01/22/2020     No recommendations at this time    Hepatitis B One screening covered for patients with certain risk factors   -  No recommendations at this time    Tetanus Toxoid Not covered by Medicare Part B unless medically necessary (cut with metal); may be covered with your pharmacy prescription benefits -    Tetanus, Diptheria and Pertusis TD and TDaP Not covered by Medicare Part B -  No recommendations at this time    Zoster Not covered by Medicare Part B; may be covered with your pharmacy  prescription benefits -  Zoster Vaccines(1 of 2) Never done     Diabetes      Hemoglobin A1C Annually; if last  result is elevated, may be asked to retest more frequently.    Medicare covers every 3 months Lab Results   Component Value Date     (H) 03/13/2024    A1C 9.9 (H) 03/13/2024       No recommendations at this time    Creat/alb ratio Annually Lab Results   Component Value Date    MICROALBCREA 310.5 (H) 03/13/2024       LDL Annually Lab Results   Component Value Date    LDL 83 03/13/2024       Dilated Eye Exam Annually Last Diabetic Eye Exam:  No data recorded  No data recorded       Annual Monitoring of Persistent Medications (ACE/ARB, digoxin diuretics, anticonvulsants)    Potassium Annually Lab Results   Component Value Date    K 4.8 03/13/2024         Creatinine   Annually Lab Results   Component Value Date    CREATSERUM 1.63 (H) 03/13/2024         BUN Annually Lab Results   Component Value Date    BUN 17 03/13/2024       Drug Serum Conc Annually No results found for: \"DIGOXIN\", \"DIG\", \"VALP\"

## 2024-04-08 ENCOUNTER — HOSPITAL ENCOUNTER (OUTPATIENT)
Dept: ULTRASOUND IMAGING | Age: 69
Discharge: HOME | End: 2024-04-08
Attending: INTERNAL MEDICINE
Payer: MEDICARE

## 2024-04-08 DIAGNOSIS — E11.22 TYPE 2 DIABETES MELLITUS WITH STAGE 3A CHRONIC KIDNEY DISEASE AND HYPERTENSION (HCC): ICD-10-CM

## 2024-04-08 DIAGNOSIS — N18.31 TYPE 2 DIABETES MELLITUS WITH STAGE 3A CHRONIC KIDNEY DISEASE AND HYPERTENSION (HCC): ICD-10-CM

## 2024-04-08 DIAGNOSIS — E11.29 PROTEINURIA DUE TO TYPE 2 DIABETES MELLITUS (HCC): ICD-10-CM

## 2024-04-08 DIAGNOSIS — I12.9 TYPE 2 DIABETES MELLITUS WITH STAGE 3A CHRONIC KIDNEY DISEASE AND HYPERTENSION (HCC): ICD-10-CM

## 2024-04-08 DIAGNOSIS — R80.9 PROTEINURIA DUE TO TYPE 2 DIABETES MELLITUS (HCC): ICD-10-CM

## 2024-04-08 PROCEDURE — 76775 US EXAM ABDO BACK WALL LIM: CPT | Performed by: INTERNAL MEDICINE

## 2024-04-19 NOTE — TELEPHONE ENCOUNTER
Please Review. Protocol Failed; No Protocol   No Active/ Future labs pended  Component Value Date     A1C 9.9 (H) 03/13/2024     GFR Evaluation  EGFRCR: 45 , resulted on 3/13/2024    Requested Prescriptions   Pending Prescriptions Disp Refills    METFORMIN  MG Oral Tablet 24 Hr [Pharmacy Med Name: METFORMIN HCL ER TABS 500MG] 360 tablet 3     Sig: TAKE 2 TABLETS TWICE A DAY WITH MEALS       Diabetes Medication Protocol Failed - 4/18/2024 11:27 PM        Failed - Last A1C < 7.5 and within past 6 months     Lab Results   Component Value Date    A1C 9.9 (H) 03/13/2024             Failed - EGFRCR or GFRNAA > 50     GFR Evaluation  EGFRCR: 45 , resulted on 3/13/2024          Passed - In person appointment or virtual visit in the past 6 mos or appointment in next 3 mos     Recent Outpatient Visits              1 month ago Type 2 diabetes mellitus with other specified complication, without long-term current use of insulin (HCC)    [facility]    1 month ago Medicare annual wellness visit[facility] subsequent    [facility]    [facility] Visit    1 year ago Uncontrolled type 2 diabetes mellitus with hyperglycemia ([facility]    Office Visit    1 year ago Type 2 diabetes mellitus with other specified complication, without long-term current use of insulin ([facility])    [facility]    Office Visit    1 year ago Encounter for annual health examination    [facility]    Office Visit          Future Appointments         Provider Department Appt Notes    In 5 days [facility] CARD RM3 ECHO [facility] appt schd with wife    In 2 weeks Haily Rivera MD [facility]                      Passed - Microalbumin procedure in past 12 months or taking ACE/ARB        Passed - GFR in the past 12 months               Future Appointments         Provider Department Appt Notes    In 5 days [facility] CARD RM3 [facility]ostics appt schd with wife    In 2 weeks Haily Rivera MD [facility]           Recent Outpatient Visits              1 month ago Type 2 diabetes mellitus with other specified complication, without long-term current use of insulin (HCC)    [facility] [address], [place]    Nurse Only    1 month ago Medicare annual wellness visit[facility] subsequent    [facility]    Office Visit    1 year ago Uncontrolled type 2 diabetes mellitus with hyperglycemia (HCC)    [facility] [place] Margaret Anne, APRN    Office Visit    1 year ago Type 2 diabetes mellitus with other specified complication, without long-term current use of insulin (HCC)    [facility]    Office Visit    1 year ago Encounter for annual health examination    [facility]    Office Visit

## 2024-04-22 ENCOUNTER — TELEPHONE (OUTPATIENT)
Dept: INTERNAL MEDICINE CLINIC | Facility: CLINIC | Age: 69
End: 2024-04-22

## 2024-04-22 RX ORDER — METFORMIN HYDROCHLORIDE 500 MG/1
1000 TABLET, EXTENDED RELEASE ORAL 2 TIMES DAILY WITH MEALS
Qty: 360 TABLET | Refills: 3 | Status: SHIPPED | OUTPATIENT
Start: 2024-04-22

## 2024-04-22 NOTE — TELEPHONE ENCOUNTER
I called and informed the patient of the below.  He has not yet scheduled with Endo, advised he needs to do this asap. Asked if phone number can be sent to [email or web address], advised it already was sent and info is in [email or web address] sent 3/19/24. Advised best to call today if possible.     Patient agrees to follow through.     Patient has appt to see you 5/2024 - has lab order BMP - advised this should be done 3 months from 3/13/24 lab done. Can you confirm this is correct or do you want done 3 months from today?

## 2024-04-22 NOTE — TELEPHONE ENCOUNTER
GFR 45  CONTINUE METFORMIN 1000 MG BID  DUE TO KIDNEY FAILURE GFR 45  ADVISE TO REPEAT BMP IN 3 MONTHS  TO MONITOR KIDNEY FUNCTION    PLEASE ADVISE PT TO FOLLOW UP WITH ENDOCIRNOLOGY AS PLANNED

## 2024-04-24 ENCOUNTER — HOSPITAL ENCOUNTER (OUTPATIENT)
Dept: CV DIAGNOSTICS | Facility: HOSPITAL | Age: 69
Discharge: HOME | End: 2024-04-24
Attending: INTERNAL MEDICINE
Payer: MEDICARE

## 2024-04-24 DIAGNOSIS — R00.0 TACHYCARDIA: ICD-10-CM

## 2024-04-24 PROCEDURE — 93306 TTE W/DOPPLER COMPLETE: CPT | Performed by: INTERNAL MEDICINE

## 2024-05-07 ENCOUNTER — OFFICE VISIT (OUTPATIENT)
Dept: INTERNAL MEDICINE CLINIC | Facility: CLINIC | Age: 69
End: 2024-05-07
Payer: MEDICARE

## 2024-05-07 VITALS
HEART RATE: 86 BPM | DIASTOLIC BLOOD PRESSURE: 90 MMHG | SYSTOLIC BLOOD PRESSURE: 156 MMHG | BODY MASS INDEX: 26.5 KG/M2 | WEIGHT: 144 LBS | HEIGHT: 62 IN

## 2024-05-07 DIAGNOSIS — E78.5 HYPERLIPIDEMIA, UNSPECIFIED HYPERLIPIDEMIA TYPE: ICD-10-CM

## 2024-05-07 DIAGNOSIS — E11.69 TYPE 2 DIABETES MELLITUS WITH OTHER SPECIFIED COMPLICATION, WITHOUT LONG-TERM CURRENT USE OF INSULIN (HCC): ICD-10-CM

## 2024-05-07 DIAGNOSIS — I10 ESSENTIAL HYPERTENSION: Primary | ICD-10-CM

## 2024-05-07 DIAGNOSIS — I70.0 ATHEROSCLEROSIS OF ABDOMINAL AORTA (HCC): ICD-10-CM

## 2024-05-07 DIAGNOSIS — G47.00 INSOMNIA, UNSPECIFIED TYPE: ICD-10-CM

## 2024-05-07 DIAGNOSIS — Z12.11 SCREENING FOR COLON CANCER: ICD-10-CM

## 2024-05-07 DIAGNOSIS — N18.31 STAGE 3A CHRONIC KIDNEY DISEASE (HCC): Chronic | ICD-10-CM

## 2024-05-07 PROCEDURE — 99214 OFFICE O/P EST MOD 30 MIN: CPT | Performed by: INTERNAL MEDICINE

## 2024-05-07 PROCEDURE — 3008F BODY MASS INDEX DOCD: CPT | Performed by: INTERNAL MEDICINE

## 2024-05-07 PROCEDURE — 1126F AMNT PAIN NOTED NONE PRSNT: CPT | Performed by: INTERNAL MEDICINE

## 2024-05-07 PROCEDURE — 1159F MED LIST DOCD IN RCRD: CPT | Performed by: INTERNAL MEDICINE

## 2024-05-07 PROCEDURE — 3077F SYST BP >= 140 MM HG: CPT | Performed by: INTERNAL MEDICINE

## 2024-05-07 PROCEDURE — 3080F DIAST BP >= 90 MM HG: CPT | Performed by: INTERNAL MEDICINE

## 2024-05-07 RX ORDER — ZOLPIDEM TARTRATE 5 MG/1
5 TABLET ORAL NIGHTLY PRN
Qty: 30 TABLET | Refills: 0 | Status: SHIPPED | OUTPATIENT
Start: 2024-05-07

## 2024-05-07 RX ORDER — METOPROLOL SUCCINATE 50 MG/1
50 TABLET, EXTENDED RELEASE ORAL 2 TIMES DAILY
Qty: 180 TABLET | Refills: 1 | Status: SHIPPED | OUTPATIENT
Start: 2024-05-07

## 2024-05-07 NOTE — PROGRESS NOTES
HPI:    Patient ID: Howard Schmid is a 68 year old male.    HPI    HTN  Long standing history of hypertension     sympotms  :        Headache no  dizziness        no                             Blurred vision no  palpitaionsSyncope no  Chest pain  no  PND  Orthopnea no  Weakness  No  Low salt diet    yes    Compliance with exercise stays active  Compliance with medication yes                                          Active  walks daily  More so now than during winter months    Eating healthy    /90   Pulse 86   Ht 5' 2\" (1.575 m)   Wt 144 lb (65.3 kg)   BMI 26.34 kg/m²   Wt Readings from Last 6 Encounters:   05/07/24 144 lb (65.3 kg)   03/13/24 145 lb (65.8 kg)   01/30/23 141 lb (64 kg)   01/11/23 141 lb (64 kg)   07/27/22 141 lb (64 kg)   06/23/21 146 lb (66.2 kg)     Body mass index is 26.34 kg/m².  HGBA1C:    Lab Results   Component Value Date    A1C 9.9 (H) 03/13/2024    A1C 7.9 (H) 01/12/2023    A1C 7.4 (H) 08/05/2022     (H) 03/13/2024         Review of Systems   Constitutional: Negative.  Negative for appetite change, chills, fatigue and fever.   HENT:  Negative for congestion, ear discharge, ear pain, rhinorrhea, sinus pressure, sneezing, sore throat, trouble swallowing and voice change.    Eyes:  Negative for pain and discharge.   Respiratory:  Negative for cough, chest tightness, shortness of breath and wheezing.    Cardiovascular:  Negative for chest pain, palpitations and leg swelling.   Gastrointestinal:  Negative for abdominal distention, abdominal pain, blood in stool, constipation, diarrhea, nausea and vomiting.   Endocrine: Negative for cold intolerance and heat intolerance.   Genitourinary:  Negative for decreased urine volume, difficulty urinating, dysuria, frequency, hematuria and urgency.   Musculoskeletal:  Negative for arthralgias, back pain, gait problem and joint swelling.   Skin:  Negative for rash.   Allergic/Immunologic: Negative for environmental allergies and food  allergies.   Neurological:  Negative for dizziness, tremors, seizures, weakness, light-headedness and headaches.   Hematological:  Negative for adenopathy. Does not bruise/bleed easily.   Psychiatric/Behavioral:  Negative for behavioral problems and confusion.          Current Outpatient Medications   Medication Sig Dispense Refill    zolpidem 5 MG Oral Tab Take 1 tablet (5 mg total) by mouth nightly as needed for Sleep. 30 tablet 0    metoprolol succinate ER 50 MG Oral Tablet 24 Hr Take 1 tablet (50 mg total) by mouth in the morning and 1 tablet (50 mg total) before bedtime. 180 tablet 1    metFORMIN  MG Oral Tablet 24 Hr Take 2 tablets (1,000 mg total) by mouth 2 (two) times daily with meals. 360 tablet 3    losartan 100 MG Oral Tab TAKE 1 TABLET DAILY 90 tablet 3    atorvastatin 20 MG Oral Tab TAKE 1 TABLET DAILY 90 tablet 3    Glucose Blood (FREESTYLE LITE TEST) In Vitro Strip Test once daily . 100 strip 3    FreeStyle Lancets Does not apply Misc Test once daily . 100 each 3    Blood Glucose Monitoring Suppl (FREESTYLE LITE) Does not apply Device Test once daily . 1 Device 0    aspirin 81 MG Oral Tab EC Take  by mouth.       Allergies:  Allergies   Allergen Reactions    Chicken ITCHING    Shrimp ITCHING       HISTORY:  Past Medical History:    Calculus of kidney    NO SURGERY    Cup to disc asymmetry    Cup Disc Asymmetry, OU    Diabetes (HCC)    High cholesterol    Hypertension    Lipid screening    per [facility]    Type 2 diabetes mellitus (HCC)    Visual impairment    READING GLASSES      Past Surgical History:   Procedure Laterality Date    Excis pterygium      per [facility]:  \"Pterygium excision\"      Family History   Problem Relation Age of Onset    Diabetes Cousin     No Known Problems Son     Glaucoma Neg         No glaucoma history      Social History:   Social History     Socioeconomic History    Marital status:    Tobacco Use    Smoking status: Former     Passive exposure: Past    Smokeless  tobacco: Never   Vaping Use    Vaping status: Never Used   Substance and Sexual Activity    Alcohol use: No     Comment: (Beer) 3 beers occasionally    Drug use: No   Other Topics Concern    Caffeine Concern Yes     Comment: Coffee, 1-2 cups daily        PHYSICAL EXAM:    Physical Exam  Constitutional:       Appearance: He is well-developed. He is not ill-appearing.   Cardiovascular:      Rate and Rhythm: Normal rate and regular rhythm.      Heart sounds: Normal heart sounds.   Pulmonary:      Effort: Pulmonary effort is normal.      Breath sounds: Normal breath sounds.   Abdominal:      General: Bowel sounds are normal.      Palpations: Abdomen is soft.      Tenderness: There is no abdominal tenderness.   Skin:     General: Skin is warm and dry.   Neurological:      Mental Status: He is alert.      Deep Tendon Reflexes: Reflexes are normal and symmetric.   Psychiatric:         Mood and Affect: Mood normal.              ASSESSMENT/PLAN:   (I10) Essential hypertension  (primary encounter diagnosis)  Plan:uncontrolled  Low  salt diet  Increase metoprolol to 50 mg po bid  bP goal 130/80    (G47.00) Insomnia, unspecified type  Plan: zolpidem 5 MG Oral Tab        Chronic  prn zoldipem    (Z12.11) Screening for colon cancer  Plan: Gastro Referral - In Network        Asymptomatic  monitor  Improtance of screening adivsed    (E11.69) Type 2 diabetes mellitus with other specified complication, without long-term current use of insulin (HCC)  Plan: OPHTHALMOLOGY - EXTERNAL, ENDOCRINOLOGY -         INTERNAL        .HGBA1C:    Lab Results   Component Value Date    A1C 9.9 (H) 03/13/2024    A1C 7.9 (H) 01/12/2023    A1C 7.4 (H) 08/05/2022     (H) 03/13/2024     Uncontrolled  Pt is alking more   Did not do well on  glimepiride  hypoglycemic with 1 mg  Concnerned re cost of newer meds  Cont to exercise daily healthy diet  Referral to see  endocrinology  will make an appt    (N18.31) Stage 3a chronic kidney disease  (HCC)  Plan: chronic    Renal function worsening  impotance of follow up and  BP control  And DM control adivsed  Avoid NSAIDS  oral hydrate    (E78.5) Hyperlipidemia, unspecified hyperlipidemia type  Plan: Low cholesterol diet advised  Avoid saturated and trans fats       (I70.0) Atherosclerosis of abdominal aorta (HCC)  Plan: Asymptomatic  monitor      Medications and most recent test results reviewed  Refill medicaitons  as needed  Potential side effect discussed  Modification of risk for CAD advised    Dietary an lifestyle change  Pt voiced understanding and agrees with plan  Pt given time to ask questions  After Visit Summary handout    Discussed  And given to patient.         No orders of the defined types were placed in this encounter.      Meds This Visit:  Requested Prescriptions     Signed Prescriptions Disp Refills    zolpidem 5 MG Oral Tab 30 tablet 0     Sig: Take 1 tablet (5 mg total) by mouth nightly as needed for Sleep.    metoprolol succinate ER 50 MG Oral Tablet 24 Hr 180 tablet 1     Sig: Take 1 tablet (50 mg total) by mouth in the morning and 1 tablet (50 mg total) before bedtime.       Imaging & Referrals:  GASTRO - INTERNAL  OPHTHALMOLOGY - EXTERNAL  ENDOCRINOLOGY - INTERNAL        ID#[id number]

## 2024-05-16 ENCOUNTER — TELEPHONE (OUTPATIENT)
Dept: ENDOCRINOLOGY CLINIC | Facility: CLINIC | Age: 69
End: 2024-05-16

## 2024-05-16 NOTE — TELEPHONE ENCOUNTER
Received fax from [email or web address] dated on 05/09/24 stating the physician services has been approved QTY;3 service approve ([id number]) effective 05/09/24 ends 05/09/25  Pt has upcoming appt scheduled    ID#[id number]  AUTH#[id number]

## 2024-07-08 ENCOUNTER — OFFICE VISIT (OUTPATIENT)
Dept: ENDOCRINOLOGY CLINIC | Facility: CLINIC | Age: 69
End: 2024-07-08

## 2024-07-08 VITALS
HEIGHT: 63 IN | HEART RATE: 74 BPM | DIASTOLIC BLOOD PRESSURE: 78 MMHG | WEIGHT: 143 LBS | BODY MASS INDEX: 25.34 KG/M2 | SYSTOLIC BLOOD PRESSURE: 142 MMHG

## 2024-07-08 DIAGNOSIS — E11.65 UNCONTROLLED TYPE 2 DIABETES MELLITUS WITH HYPERGLYCEMIA (HCC): Primary | ICD-10-CM

## 2024-07-08 LAB
GLUCOSE BLOOD: 203
HEMOGLOBIN A1C: 7.1 % (ref 4.3–5.6)
TEST STRIP LOT #: NORMAL NUMERIC

## 2024-07-08 RX ORDER — AMLODIPINE BESYLATE 5 MG/1
5 TABLET ORAL DAILY
Qty: 90 TABLET | Refills: 0 | Status: SHIPPED | OUTPATIENT
Start: 2024-07-08

## 2024-07-08 RX ORDER — GLIPIZIDE 5 MG/1
5 TABLET ORAL
Qty: 90 TABLET | Refills: 0 | Status: SHIPPED | OUTPATIENT
Start: 2024-07-08

## 2024-07-08 NOTE — PROGRESS NOTES
Name: Howard Schmid  Date: 2024    Referring Physician: Haily Rivera    CHIEF COMPLAINT   Chief Complaint   Patient presents with    Diabetes     Pt is here for follow up for diabetes and A1c check     HISTORY OF PRESENT ILLNESS   Howard Schmid is a 68 year old male who presents for new consultation on diabetes management. Patient is accompanied by his wife today.   HbA1C: 7.1% at POC today. This is a decrease from 9.9% on 3/13/2024.   Blood glucose is: 203 in clinic today.     FAMILY HISTORY OF DIABETES  -both sides of cousins; brother   DIABETES HISTORY  Diagnosed: around 9202-0754  Prior HbA, C or glycohemoglobin were 9.9% 3/13/2024; 7.1% at POC today;     Patient has not had hospitalizations for blood sugar issues.   Denies any history of pancreatitis.     PREVIOUS MEDICATION FOR DM:  Glimepiride- d/c'ed due new hypoglycemia     CURRENT MEDICATIONS FOR DM:  -Metformin ER 1,000mg twice daily - tolerating well; denies GI s/e      HOME GLUCOSE READINGS:   Testing BG readings inconsistently   Meter or log book today: no   Fastin today; 140s; 105; 126; 175  After breakfast: 140s-150s   Hypoglycemia present: yes - felt symptoms of weakness and BG of 64; this occurred before lunch as a result of delaying lunch meal; this occurred around 1 month ago     HISTORY OF DIABETES COMPLICATIONS:  History of Retinopathy: no - last eye exam within the last 12 months: no   History of Neuropathy: no   History of Nephropathy: no     ASSOCIATED COMPLICATIONS:   HTN: yes   Hyperlipidemia: yes   Cardiovascular Disease: no   Peripheral Vascular Disease: no    DIETARY COMPLIANCE:  Breakfast:  coffee and bread   Snack around 10: rice (1/2 cup) with meat or fish or fried banana  Lunch around 1-2pm: rice; fish; veggies (zhuccini or eggplant or melon)  Dinner: rice and protein    EXERCISE:   Yes - walking around 4 miles per day weather permitting   - exercising at home (push up and dumbbells)      Polyuria, polyphagia,  polydipsia: yes - polyuria   Paresthesias: no   Blurred vision: no   Recent steroids, illness or infections: no    REVIEW OF SYSTEMS  Constitutional: Negative for: weight change, fever, fatigue, cold/heat intolerance  Eyes: Negative for:  Visual changes, proptosis, blurring  ENT: Negative for:  dysphagia, neck swelling, dysphonia  Respiratory: Negative for: hemoptysis, shortness of breath, cough, or dyspnea.  Cardiovascular: Negative for:  chest pain, chest discomfort, palpitations  GI: Negative for:  abdominal pain, nausea, vomiting, diarrhea, heartburn, constipation  Neurology: Negative for: headache, dizziness, syncope, numbness/tingling, or weakness.   Genito-Urinary: Negative for: dysuria, frequency or hematuria   Hematology/Lymphatics: Negative for: bruising, easy bleeding, lower extremity edema  Skin: Negative for: rash, blister, infection or ulcers.  Endocrine: Negative for: polyuria, polydipsia. No osteoporosis. No thyroid disease.     MEDICATIONS:     Current Outpatient Medications:     zolpidem 5 MG Oral Tab, Take 1 tablet (5 mg total) by mouth nightly as needed for Sleep., Disp: 30 tablet, Rfl: 0    metoprolol succinate ER 50 MG Oral Tablet 24 Hr, Take 1 tablet (50 mg total) by mouth in the morning and 1 tablet (50 mg total) before bedtime., Disp: 180 tablet, Rfl: 1    metFORMIN  MG Oral Tablet 24 Hr, Take 2 tablets (1,000 mg total) by mouth 2 (two) times daily with meals., Disp: 360 tablet, Rfl: 3    losartan 100 MG Oral Tab, TAKE 1 TABLET DAILY, Disp: 90 tablet, Rfl: 3    atorvastatin 20 MG Oral Tab, TAKE 1 TABLET DAILY, Disp: 90 tablet, Rfl: 3    Glucose Blood (FREESTYLE LITE TEST) In Vitro Strip, Test once daily ., Disp: 100 strip, Rfl: 3    FreeStyle Lancets Does not apply Misc, Test once daily ., Disp: 100 each, Rfl: 3    Blood Glucose Monitoring Suppl (FREESTYLE LITE) Does not apply Device, Test once daily ., Disp: 1 Device, Rfl: 0    aspirin 81 MG Oral Tab EC, Take  by mouth., Disp: , Rfl:      ALLERGIES:   Allergies   Allergen Reactions    Chicken ITCHING    Shrimp ITCHING       SOCIAL HISTORY:   Social History     Socioeconomic History    Marital status:    Tobacco Use    Smoking status: Former     Passive exposure: Past    Smokeless tobacco: Never   Vaping Use    Vaping status: Never Used   Substance and Sexual Activity    Alcohol use: No     Comment: (Beer) 3 beers occasionally    Drug use: No   Other Topics Concern    Caffeine Concern Yes     Comment: Coffee, 1-2 cups daily       PAST MEDICAL HISTORY:   Past Medical History:    Calculus of kidney    NO SURGERY    Cup to disc asymmetry    Cup Disc Asymmetry, OU    Diabetes (HCC)    High cholesterol    Hypertension    Lipid screening    per [facility]    Type 2 diabetes mellitus (HCC)    Visual impairment    READING GLASSES       PAST SURGICAL HISTORY:   Past Surgical History:   Procedure Laterality Date    Excis pterygium      per [facility]:  \"Pterygium excision\"     PHYSICAL EXAM:   Vitals:    07/08/24 1406 07/08/24 1515   BP: (!) 179/92 142/78   Pulse: 74    Weight: 143 lb (64.9 kg)    Height: 5' 3\" (1.6 m)      BMI:   Body mass index is 25.33 kg/m².    General Appearance:  alert, well developed, in no acute distress  Nutritional:  no extreme weight gain or loss  Head: Atraumatic  Eyes:  normal conjunctivae, sclera., normal sclera and normal pupils  Throat/Neck: normal sound to voice. Normal hearing, normal speech  Back: no kyphosis  Respiratory:  Speaking in full sentences, non-labored. no increased work of breathing, no audible wheezing    Skin:  normal moisture and skin texture, no visible lesions  Hair and nails: normal scalp hair  Hematologic:  no excessive bruising  Neuro: motor grossly intact, moving all extremities without difficulty  Psychiatric:  oriented to time, self, and place  Extremities: no obvious extremity swelling, no lesions    LABS: Pertinent labs reviewed    ASSESSMENT/PLAN:    -Reviewed with patient the pathogenesis of  diabetes, clinical significance of A1c, and common complications such as: microvascular, macrovascular and diabetic ketoacidosis. Patient verbalizes understanding of the importance of glycemic control and the goals of therapy.   Per ADA 2024 guidelines, it is recommended that older adults (age 65 and above) who are healthy with few coexisting chronic illnesses, intact cognitive and functional status, should have A1C goal of <7.5%, fasting or preprandial glucose of  and bedtime glucose of .     1.Type 2 Diabetes Mellitus, uncontrolled, with hyperglycemia   -LAB DATA  HbA1C: 7.1% today   a) Medications  - continue with Metformin ER 1,000mg with breakfast               1,000mg with dinner  - start taking Glipizide 2.5mg with higher carb meal only    - if glucose reading 2hrs after meal is >180, take 1 tablet next time    -discussed to start limiting carbs to 45gm per meal/ max 135gm per day.   -discussed to increase protein and veggies with each meal   - discussed to limit rice to 1/2 cup twice per day   - low carb handout was given to patient for reference   - discussed to increase PO water to 60oz per day   -discussed to avoid eating snacks between meals.   -discussed to eliminate added sugars to diet   -instructed to eat dinner at least 2hrs before bedtime.   -discussed to continue to stay active as safely able/currently doing   -reviewed target goal BG readings and A1C  -reviewed when to call and notify me of abnormal BG readings.     b) No nephropathy: GFR: 45 and urine MA: 310.5 on 3/13/2024  c) Discussed importance of annual eye exams. Encouraged to schedule apt with Dr. Chen as soon as able   d) Foot exam: will defer to next f/u visit   e) start testing BG 1x daily - alternate with fasting or 2hrs after meals   f) Life style changes reviewed     2. Hypertension   -BP above goal in clinic today   -BP usually in 140s- 150s/80s   -currently taking Metoprolol succinate ER 50mg daily and losartan 100mg  daily   - start amlodipine 5mg once daily in AM - Risks and benefits reviewed. Verbalizes understanding.    3.Lipids Management   - on atorvastatin 20mg nightly   - LDL: 83 and Tri on 3/13/2024      RTC in 3 months   Patient instructed to call sooner if they develop Blood glucose readings <75 and/or if they have readings persistently >200.     The risks and benefits of my recommendations, as well as other treatment options were discussed with the patient today. questions were also answered to the best of my knowledge. Patient verbalizes understanding of these issues and agrees to the plan.    2024  SHASHA Hannah

## 2024-07-08 NOTE — PATIENT INSTRUCTIONS
A1C: 7.1% today --> decreased from 9.9% on 3/13/2024  Blood glucose: 203 in clinic today    Medications:   - continue with Metformin ER 1,000mg with breakfast        1,000mg with dinner  - start taking Glipizide 2.5mg with higher carb meal only    - if glucose reading 2hrs after meal is >180, take 1 tablet next time    -increase protein with each meal   - increase water intake to 60oz per day   - repeat kidney labs in second week of August - no fasting needed     Schedule apt with eye doctor:   Provider Address Phone   Dea Chen MD [address]  [place] [address] [phone]         Blood pressure medication:   - continue with Metoprolol succinate ER 50mg daily  - continue with Losartan 100mg daily   - start Amlodipine 5mg daily     Blood pressure goal:  <140/80     Weight:  Wt Readings from Last 6 Encounters:   07/08/24 143 lb (64.9 kg)   05/07/24 144 lb (65.3 kg)   03/13/24 145 lb (65.8 kg)   01/30/23 141 lb (64 kg)   01/11/23 141 lb (64 kg)   07/27/22 141 lb (64 kg)     A1C goal:  <7.0%    Blood sugar testing:  Test your blood sugar 1 times daily   Recommended times to test: Before breakfast (fasting) or 2hrs after meals     Blood sugar targets:  Before breakfast:  (preferably < 110)  2 hours after meals: <180 (preferably <150)     Call for persistent blood sugars < 75 or > 200

## 2024-07-10 ENCOUNTER — TELEPHONE (OUTPATIENT)
Facility: CLINIC | Age: 69
End: 2024-07-10

## 2024-07-10 ENCOUNTER — OFFICE VISIT (OUTPATIENT)
Facility: CLINIC | Age: 69
End: 2024-07-10

## 2024-07-10 VITALS
BODY MASS INDEX: 26.31 KG/M2 | SYSTOLIC BLOOD PRESSURE: 180 MMHG | WEIGHT: 143 LBS | HEIGHT: 62 IN | DIASTOLIC BLOOD PRESSURE: 84 MMHG | HEART RATE: 85 BPM

## 2024-07-10 DIAGNOSIS — Z12.11 COLON CANCER SCREENING: Primary | ICD-10-CM

## 2024-07-10 DIAGNOSIS — Z12.11 SCREENING FOR COLON CANCER: Primary | ICD-10-CM

## 2024-07-10 PROCEDURE — 3077F SYST BP >= 140 MM HG: CPT | Performed by: STUDENT IN AN ORGANIZED HEALTH CARE EDUCATION/TRAINING PROGRAM

## 2024-07-10 PROCEDURE — 3079F DIAST BP 80-89 MM HG: CPT | Performed by: STUDENT IN AN ORGANIZED HEALTH CARE EDUCATION/TRAINING PROGRAM

## 2024-07-10 PROCEDURE — 1160F RVW MEDS BY RX/DR IN RCRD: CPT | Performed by: STUDENT IN AN ORGANIZED HEALTH CARE EDUCATION/TRAINING PROGRAM

## 2024-07-10 PROCEDURE — 1126F AMNT PAIN NOTED NONE PRSNT: CPT | Performed by: STUDENT IN AN ORGANIZED HEALTH CARE EDUCATION/TRAINING PROGRAM

## 2024-07-10 PROCEDURE — 3008F BODY MASS INDEX DOCD: CPT | Performed by: STUDENT IN AN ORGANIZED HEALTH CARE EDUCATION/TRAINING PROGRAM

## 2024-07-10 PROCEDURE — 1159F MED LIST DOCD IN RCRD: CPT | Performed by: STUDENT IN AN ORGANIZED HEALTH CARE EDUCATION/TRAINING PROGRAM

## 2024-07-10 PROCEDURE — 99203 OFFICE O/P NEW LOW 30 MIN: CPT | Performed by: STUDENT IN AN ORGANIZED HEALTH CARE EDUCATION/TRAINING PROGRAM

## 2024-07-10 RX ORDER — MAGNESIUM 30 MG
30 TABLET ORAL 2 TIMES DAILY
COMMUNITY

## 2024-07-10 NOTE — H&P
[facility] - Gastroenterology                                                                                                  Clinic History and Physical       Reason for consult: Colon cancer screening    Requesting physician or provider: Haily Rivera MD    Chief Complaint   Patient presents with    Consult     Colonoscopy       HPI:   Howard Schmid is a 68 year old year-old male with history of DM2, HTH, HLD, who presents for colon cancer screening evaluation.    Patient denies any GI symptoms of nausea, vomiting, dyspepsia, dysphagia, hematemesis, abdominal pain, change in bowel habits, thin stools, hematochezia, or melena.  Additionally there is no weight loss and no reported history of chest pain or shortness of breath.  -no family history of colon cancer.  -no significant constipation issues.    FIT negative 2023 per patient    Prior endoscopies:  none    Soc:  -No smoking  -Yes Etoh    History, Medications, Allergies, ROS:      Past Medical History:    Calculus of kidney    NO SURGERY    Cup to disc asymmetry    Cup Disc Asymmetry, OU    Diabetes (HCC)    High cholesterol    Hypertension    Lipid screening    per [facility]    Type 2 diabetes mellitus (HCC)    Visual impairment    READING GLASSES      Past Surgical History:   Procedure Laterality Date    Excis pterygium      per [facility]:  \"Pterygium excision\"      Family Hx:   Family History   Problem Relation Age of Onset    Diabetes Cousin     No Known Problems Son     Glaucoma Neg         No glaucoma history      Social History:   Social History     Socioeconomic History    Marital status:    Tobacco Use    Smoking status: Former     Passive exposure: Past    Smokeless tobacco: Never   Vaping Use    Vaping status: Never Used   Substance and Sexual Activity    Alcohol use: Yes     Comment: (Beer) 3 beers occasionally    Drug use: No   Other Topics Concern    Caffeine Concern Yes     Comment: Coffee, 1-2 cups daily        Medications (Active  prior to today's visit):  Current Outpatient Medications   Medication Sig Dispense Refill    glipiZIDE 5 MG Oral Tab Take 1 tablet (5 mg total) by mouth daily with dinner. 90 tablet 0    amLODIPine 5 MG Oral Tab Take 1 tablet (5 mg total) by mouth daily. 90 tablet 0    zolpidem 5 MG Oral Tab Take 1 tablet (5 mg total) by mouth nightly as needed for Sleep. 30 tablet 0    metoprolol succinate ER 50 MG Oral Tablet 24 Hr Take 1 tablet (50 mg total) by mouth in the morning and 1 tablet (50 mg total) before bedtime. 180 tablet 1    metFORMIN  MG Oral Tablet 24 Hr Take 2 tablets (1,000 mg total) by mouth 2 (two) times daily with meals. 360 tablet 3    losartan 100 MG Oral Tab TAKE 1 TABLET DAILY 90 tablet 3    atorvastatin 20 MG Oral Tab TAKE 1 TABLET DAILY 90 tablet 3    Glucose Blood (FREESTYLE LITE TEST) In Vitro Strip Test once daily . 100 strip 3    FreeStyle Lancets Does not apply Misc Test once daily . 100 each 3    Blood Glucose Monitoring Suppl (FREESTYLE LITE) Does not apply Device Test once daily . 1 Device 0    aspirin 81 MG Oral Tab EC Take  by mouth.      magnesium 30 MG Oral Tab Take 1 tablet (30 mg total) by mouth 2 (two) times daily.         Allergies:  Allergies   Allergen Reactions    Chicken ITCHING    Shrimp ITCHING       ROS:   CONSTITUTIONAL:  negative for fevers, rigors  EYES:  negative for diplopia   RESPIRATORY:  negative for severe shortness of breath  CARDIOVASCULAR:  negative for crushing sub-sternal chest pain  GASTROINTESTINAL:  see HPI  GENITOURINARY:  negative for dysuria or gross hematuria  INTEGUMENT/BREAST:  SKIN:  negative for jaundice   ALLERGIC/IMMUNOLOGIC:  negative for hay fever  ENDOCRINE:  negative for cold intolerance and heat intolerance  MUSCULOSKELETAL:  negative for joint effusion/severe erythema  BEHAVIOR/PSYCH:  negative for psychotic behavior      PHYSICAL EXAM:   Weight 143 lb (64.9 kg).    Gen- Patient appears comfortable and in no acute discomfort  HEENT: the  sclera appears anicteric, oropharynx clear, mucus membranes appear moist  CV- regular rate and rhythm, the extremities are warm and well perfused   Lung- Moves air well; No labored breathing  Abdomen- soft, non-distended  Skin- No jaundice  Ext: no edema is evident.   Neuro- Alert and interactive, and gross movements of extremities normal    Labs/Imaging:     Patient's labs and imaging were reviewed and discussed with patient today.      ASSESSMENT/PLAN:   Howard Schmid is a 68 year old year-old male with history of DM2, HTH, HLD, who presents for colon cancer screening evaluation. No anemia noted on lab work.    # Average Risk screening: patient is considered average risk for colon cancer (nofamily hx of colon cancer) and it is appropriate to proceed with screening colonoscopy. Patient is currently asymptomatic and denies diarrhea, hematochezia, thin-stools or weight loss. We discussed risks/benefits/alternatives to procedure, including CT colonography and stool testing, they want to proceed with colonoscopy.    Recommend:  1. Schedule colonoscopy with [facility] [Diagnosis: Colon cancer screening]    2.  bowel prep from pharmacy (split dose golytely)    3. Medication adjustment:       A. Day BEFORE colonoscopy: HOLD Metformin and Glipizide     B. Day OF colonoscopy: HOLD Metformin and Glipizide     C. Continue ALL other medications as usual    Colonoscopy consent: I have discussed the risks, benefits, and alternatives to colonoscopy with the patient [who demonstrated understanding], including but not limited to the risks of bleeding, infection, pain, as well as the risks of anesthesia and perforation all leading to prolonged hospitalization, surgical intervention. I also specifically mentioned the miss rate of colonoscopy of 5-10% in the best of all circumstances. All questions were answered to the patient’s satisfaction. The patient elected to proceed with colonoscopy with intervention [i.e. polypectomy, etc.]  as indicated.    Orders This Visit:  No orders of the defined types were placed in this encounter.      Meds This Visit:  Requested Prescriptions      No prescriptions requested or ordered in this encounter       Imaging & Referrals:  None       Campbell Wright MD  [facility]  7/10/2024

## 2024-07-10 NOTE — PATIENT INSTRUCTIONS
1. Schedule colonoscopy with [facility] [Diagnosis: Colon cancer screening]    2.  bowel prep from pharmacy (split dose golytely)    3. Medication adjustment:       A. Day BEFORE colonoscopy: HOLD Metformin and Glipizide     B. Day OF colonoscopy: HOLD Metformin and Glipizide     C. Continue ALL other medications as usual    4. Read all bowel prep instructions carefully    5. AVOID seeds, nuts, popcorn, raw fruits and vegetables (cooked is okay) for 2-3 days before procedure    6. If you start any NEW medication after your visit today, please notify us. Certain medications will need to be held before the procedure, or the procedure cannot be performed.

## 2024-07-10 NOTE — TELEPHONE ENCOUNTER
Scheduled for:  Colonoscopy 22734  Provider Name:   Kyle  Date:  12/13/2024  Location:  [facility]. +15  Sedation:  MAC  Time:  1:15 pm. (pt is aware that [facility] will call the day before to confirm arrival time)  Prep:  Golytely  Meds/Allergies Reconciled?:  Physician Reviewed  Diagnosis with codes:    CRC screening Z12.11  Was patient informed to call insurance with codes (Y/N):  Yes  Referral sent?:  Referral was sent at the time of electronic surgical scheduling.  [facility] or [facility] notified?:  I sent an electronic request to Endo Scheduling and received a confirmation today.  Medication Orders:  Patient is aware to NOT take iron pills, herbal meds and diet supplements for 7 days before exam.   A. Day BEFORE colonoscopy: HOLD Metformin and Glipizide  B. Day OF colonoscopy: HOLD Metformin and Glipizide  Also to NOT take any form of alcohol, recreational drugs and any forms of ED meds 24-72 hours before exam.   Misc Orders:  N/A   Further instructions given by staff:  I discussed the prep instructions with the patient which he verbally understood. I provided patient with prep instruction's sheet in office.      Patient was informed about the new cancellation policy for his/her procedure. Patient was also given a copy of the cancellation policy at the time of the appointment and verbalized understanding.

## 2024-08-13 ENCOUNTER — LAB ENCOUNTER (OUTPATIENT)
Dept: LAB | Age: 69
End: 2024-08-13
Attending: NURSE PRACTITIONER
Payer: MEDICARE

## 2024-08-13 DIAGNOSIS — E11.65 UNCONTROLLED TYPE 2 DIABETES MELLITUS WITH HYPERGLYCEMIA (HCC): ICD-10-CM

## 2024-08-13 LAB
ALBUMIN SERPL-MCNC: 4.7 G/DL (ref 3.2–4.8)
ALBUMIN/GLOB SERPL: 1.7 {RATIO} (ref 1–2)
ALP LIVER SERPL-CCNC: 48 U/L
ALT SERPL-CCNC: 29 U/L
ANION GAP SERPL CALC-SCNC: 9 MMOL/L (ref 0–18)
AST SERPL-CCNC: 27 U/L (ref ?–34)
BILIRUB SERPL-MCNC: 0.9 MG/DL (ref 0.2–1.1)
BUN BLD-MCNC: 16 MG/DL (ref 9–23)
BUN/CREAT SERPL: 11.4 (ref 10–20)
CALCIUM BLD-MCNC: 10.2 MG/DL (ref 8.7–10.4)
CHLORIDE SERPL-SCNC: 102 MMOL/L (ref 98–112)
CO2 SERPL-SCNC: 27 MMOL/L (ref 21–32)
CREAT BLD-MCNC: 1.4 MG/DL
CREAT UR-SCNC: 19.8 MG/DL
EGFRCR SERPLBLD CKD-EPI 2021: 54 ML/MIN/1.73M2 (ref 60–?)
FASTING STATUS PATIENT QL REPORTED: NO
GLOBULIN PLAS-MCNC: 2.8 G/DL (ref 2–3.5)
GLUCOSE BLD-MCNC: 166 MG/DL (ref 70–99)
MICROALBUMIN UR-MCNC: 1.6 MG/DL
MICROALBUMIN/CREAT 24H UR-RTO: 80.8 UG/MG (ref ?–30)
OSMOLALITY SERPL CALC.SUM OF ELEC: 291 MOSM/KG (ref 275–295)
POTASSIUM SERPL-SCNC: 4.8 MMOL/L (ref 3.5–5.1)
PROT SERPL-MCNC: 7.5 G/DL (ref 5.7–8.2)
SODIUM SERPL-SCNC: 138 MMOL/L (ref 136–145)

## 2024-08-13 PROCEDURE — 80053 COMPREHEN METABOLIC PANEL: CPT

## 2024-08-13 PROCEDURE — 36415 COLL VENOUS BLD VENIPUNCTURE: CPT

## 2024-08-13 PROCEDURE — 82570 ASSAY OF URINE CREATININE: CPT

## 2024-08-13 PROCEDURE — 82043 UR ALBUMIN QUANTITATIVE: CPT

## 2024-08-28 RX ORDER — BLOOD-GLUCOSE METER
KIT MISCELLANEOUS
Qty: 100 STRIP | Refills: 3 | Status: SHIPPED | OUTPATIENT
Start: 2024-08-28

## 2024-08-28 NOTE — TELEPHONE ENCOUNTER
Current Outpatient Medications:     metoprolol succinate ER 50 MG Oral Tablet 24 Hr, Take 1 tablet (50 mg total) by mouth in the morning and 1 tablet (50 mg total) before bedtime., Disp: 180 tablet, Rfl: 1

## 2024-08-28 NOTE — TELEPHONE ENCOUNTER
Refill Per Protocol     Requested Prescriptions   Pending Prescriptions Disp Refills    FREESTYLE LITE TEST In Vitro Strip [Pharmacy Med Name: FREESTYLE LITE STRIPS 100'S] 100 strip 3     Sig: Test once daily .       Diabetic Supplies Protocol Passed - 8/26/2024  2:07 PM        Passed - In person appointment or virtual visit in the past 12 mos or appointment in next 3 mos     Recent Outpatient Visits              1 month ago Colon cancer screening    [facility], Campbell Cutler MD    Office Visit    1 month ago Uncontrolled type 2 diabetes mellitus with hyperglycemia (HCC)    [facility], Asher Breen APRN    Office Visit    3 months ago Essential hypertension    [facility], Haily Reed MD    Office Visit    5 months ago Type 2 diabetes mellitus with other specified complication, without long-term current use of insulin (HCC)    [facility] [place]    Nurse Only    5 months ago Medicare annual wellness visit, subsequent    [facility], Haily Epperson MD    Office Visit          Future Appointments         Provider Department Appt Notes    In 2 months [facility] RM[facility] - [place] [facility] called to schedule a CT Calcium Score advised its $49 due upon arrival at [id number]/8/24    In 3 months [facility]                           Future Appointments         Provider Department Appt Notes    In 2 months [facility] CT RM1 [facility] CT - [place] [facility] called to schedule a CT Calcium Score advised its $49 due upon arrival at [id number]-5/8/24    In 3 months [facility]          Recent Outpatient Visits              1 month ago Colon  cancer screening    [facility], Campbell Cutler MD    Office Visit    1 month ago Uncontrolled type 2 diabetes mellitus with hyperglycemia (HCC)    [facility], Asher Breen APRN    Office Visit    3 months ago Essential hypertension    [facility], Haily Reed MD    Office Visit    5 months ago Type 2 diabetes mellitus with other specified complication, without long-term current use of insulin (HCC)    [facility]    [facility] Only    5 months ago Medicare annual wellness visit, subsequent    [facility]    [facility] Visit

## 2024-08-30 NOTE — TELEPHONE ENCOUNTER
Please review. Protocol Failed; No Protocol    Requested Prescriptions   Pending Prescriptions Disp Refills    metoprolol succinate ER 50 MG Oral Tablet 24 Hr 180 tablet 1     Sig: Take 1 tablet (50 mg total) by mouth in the morning and 1 tablet (50 mg total) before bedtime.       Hypertension Medications Protocol Failed - 8/28/2024 11:40 AM        Failed - Last BP reading less than 140/90     BP Readings from Last 1 Encounters:   07/10/24 (!) 180/84               Passed - CMP or BMP in past 12 months        Passed - In person appointment or virtual visit in the past 12 mos or appointment in next 3 mos     Recent Outpatient Visits              1 month ago Colon cancer screening    [facility] Campbell Alexander MD    Office Visit    1 month ago Uncontrolled type 2 diabetes mellitus with hyperglycemia (HCC)    [facility], Asher Breen APRN    Office Visit    3 months ago Essential hypertension    [facility], Haily Reed MD    Office Visit    5 months ago Type 2 diabetes mellitus with other specified complication, without long-term current use of insulin (HCC)    [facility]    [facility]urse Only    5 months ago Medicare annual wellness visit, subsequent    [facility]    [facility] Visit          Future Appointments         Provider Department Appt Notes    In 2 months [facility] CT [address] - [place] called to schedule a CT Calcium Score advised its $49 due upon arrival at [id number]-8/8/24    In 3 months [facility] Colonoscopy w/MAC @ [facility]                    Passed - EGFRCR or GFRNAA > 50     GFR Evaluation  EGFRCR: 54 , resulted on 8/13/2024                 Future Appointments         Provider Department Appt Notes     In 2 months [facility] CT RM1 [facility] CT - [place] called to schedule a CT Calcium Score advised its $49 due upon arrival at [id number]-9/8/24    In 3 months [facility] Colonoscopy w/MAC @ [facility]          Recent Outpatient Visits              1 month ago Colon cancer screening    [facility] Campbell Alexander MD    Office Visit    1 month ago Uncontrolled type 2 diabetes mellitus with hyperglycemia (HCC)    [facility] [place] [place], Asher Breen APRN    Office Visit    3 months ago Essential hypertension    [facility]    Office Visit    5 months ago Type 2 diabetes mellitus with other specified complication, without long-term current use of insulin (HCC)    [facility] [place] [place] [facility]    Nurse Only    5 months ago Medicare annual wellness visit, subsequent    [facility] [place] [place] [facility]    Office Visit

## 2024-09-03 RX ORDER — METOPROLOL SUCCINATE 50 MG/1
50 TABLET, EXTENDED RELEASE ORAL 2 TIMES DAILY
Qty: 180 TABLET | Refills: 3 | Status: SHIPPED | OUTPATIENT
Start: 2024-09-03

## 2024-09-18 DIAGNOSIS — G47.00 INSOMNIA, UNSPECIFIED TYPE: ICD-10-CM

## 2024-09-23 NOTE — TELEPHONE ENCOUNTER
Please review. Protocol Failed; No Protocol      Recent fills: 5/77/2024  Last Rx written: 5/7/2024  Last office visit: 5/7/2024        Requested Prescriptions   Pending Prescriptions Disp Refills    ZOLPIDEM 5 MG Oral Tab [Pharmacy Med Name: Zolpidem Tartrate 5 MG Oral Tablet] 30 tablet 0     Sig: TAKE 1 TABLET BY MOUTH NIGHTLY AS NEEDED FOR SLEEP       Controlled Substance Medication Failed - 9/18/2024  8:52 AM        Failed - This medication is a controlled substance - forward to provider to refill               Future Appointments         Provider Department Appt Notes    In 1 month [facility] CT RM1 [facility] CT - [place] called to schedule a CT Calcium Score advised its $49 due upon arrival at [id number]-4/8/24    In 2 months [facility] [place] Colonoscopy w/MAC @ [facility]          Recent Outpatient Visits              2 months ago Colon cancer screening    [facility] [place] Campbell Alexander MD    Office Visit    2 months ago Uncontrolled type 2 diabetes mellitus with hyperglycemia (HCC)    [facility]    Office Visit    4 months ago Essential hypertension    [facility] [facility]    Office Visit    6 months ago Type 2 diabetes mellitus with other specified complication, without long-term current use of insulin (HCC)    [facility]    Nurse Only    6 months ago Medicare annual wellness visit, subsequent    [facility]    Office Visit

## 2024-09-25 RX ORDER — ZOLPIDEM TARTRATE 5 MG/1
5 TABLET ORAL NIGHTLY PRN
Qty: 30 TABLET | Refills: 0 | Status: SHIPPED | OUTPATIENT
Start: 2024-09-25

## 2024-10-17 RX ORDER — ATORVASTATIN CALCIUM 20 MG/1
TABLET, FILM COATED ORAL
Qty: 90 TABLET | Refills: 3 | Status: SHIPPED | OUTPATIENT
Start: 2024-10-17

## 2024-10-17 NOTE — TELEPHONE ENCOUNTER
Refill Per Protocol     Requested Prescriptions   Pending Prescriptions Disp Refills    ATORVASTATIN 20 MG Oral Tab [Pharmacy Med Name: ATORVASTATIN TABS 20MG] 90 tablet 3     Sig: TAKE 1 TABLET DAILY       Cholesterol Medication Protocol Passed - 10/17/2024  3:59 PM        Passed - ALT < 80     Lab Results   Component Value Date    ALT 29 08/13/2024             Passed - ALT resulted within past year        Passed - Lipid panel within past 12 months     Lab Results   Component Value Date    CHOLEST 151 03/13/2024    TRIG 162 (H) 03/13/2024    HDL 40 03/13/2024    LDL 83 03/13/2024    VLDL 26 03/13/2024    NONHDLC 111 03/13/2024             Passed - In person appointment or virtual visit in the past 12 mos or appointment in next 3 mos     Recent Outpatient Visits              3 months ago Colon cancer screening    [facility]    Office Visit    3 months ago Uncontrolled type 2 diabetes mellitus with hyperglycemia (HCC)    [facility]    Office Visit    5 months ago Essential hypertension    [facility]    Office Visit    7 months ago Type 2 diabetes mellitus with other specified complication, without long-term current use of insulin (HCC)    [facility]    7 months ago Medicare annual wellness visit, subsequent    [facility]    [facility] Visit          Future Appointments         Provider Department Appt Notes    In 1 month [facility] CT [address] - [place] called to schedule a CT Calcium Score advised its $49 due upon arrival at [id number]-0/8/24    In 1 month [facility] Colonoscopy w/MAC @ [facility]                           Future Appointments          Provider Department Appt Notes    In 1 month [facility] CT [facility] CT - [place] called to schedule a CT Calcium Score advised its $49 due upon arrival at [id number]-4/8/24    In 1 month [facility] Colonoscopy w/MAC @ [facility]          Recent Outpatient Visits              3 months ago Colon cancer screening    [facility] Campbell Alexander MD    Office Visit    3 months ago Uncontrolled type 2 diabetes mellitus with hyperglycemia (HCC)    [place], Asher Breen APRN    Office Visit    5 months ago Essential hypertension    [facility], Haily Reed MD    Office Visit    7 months ago Type 2 diabetes mellitus with other specified complication, without long-term current use of insulin (HCC)    [facility]    [facility]    7 months ago Medicare annual wellness visit, subsequent    [facility]    [facility] Visit

## 2024-11-20 ENCOUNTER — HOSPITAL ENCOUNTER (OUTPATIENT)
Dept: CT IMAGING | Age: 69
Discharge: HOME | End: 2024-11-20
Attending: INTERNAL MEDICINE

## 2024-11-20 DIAGNOSIS — Z13.6 SCREENING FOR CARDIOVASCULAR CONDITION: ICD-10-CM

## 2024-12-13 ENCOUNTER — ANESTHESIA (OUTPATIENT)
Dept: ENDOSCOPY | Facility: HOSPITAL | Age: 69
End: 2024-12-13
Payer: MEDICARE

## 2024-12-13 ENCOUNTER — HOSPITAL ENCOUNTER (OUTPATIENT)
Facility: HOSPITAL | Age: 69
Setting detail: HOSPITAL OUTPATIENT SURGERY
Discharge: HOME | End: 2024-12-13
Attending: STUDENT IN AN ORGANIZED HEALTH CARE EDUCATION/TRAINING PROGRAM | Admitting: STUDENT IN AN ORGANIZED HEALTH CARE EDUCATION/TRAINING PROGRAM
Payer: MEDICARE

## 2024-12-13 ENCOUNTER — ANESTHESIA EVENT (OUTPATIENT)
Dept: ENDOSCOPY | Facility: HOSPITAL | Age: 69
End: 2024-12-13
Payer: MEDICARE

## 2024-12-13 VITALS
DIASTOLIC BLOOD PRESSURE: 72 MMHG | BODY MASS INDEX: 26.87 KG/M2 | WEIGHT: 146 LBS | HEIGHT: 62 IN | RESPIRATION RATE: 18 BRPM | TEMPERATURE: 98 F | OXYGEN SATURATION: 99 % | SYSTOLIC BLOOD PRESSURE: 128 MMHG | HEART RATE: 68 BPM

## 2024-12-13 DIAGNOSIS — Z12.11 SCREENING FOR COLON CANCER: ICD-10-CM

## 2024-12-13 LAB — GLUCOSE BLDC GLUCOMTR-MCNC: 122 MG/DL (ref 70–99)

## 2024-12-13 PROCEDURE — 45380 COLONOSCOPY AND BIOPSY: CPT | Performed by: STUDENT IN AN ORGANIZED HEALTH CARE EDUCATION/TRAINING PROGRAM

## 2024-12-13 PROCEDURE — 45385 COLONOSCOPY W/LESION REMOVAL: CPT | Performed by: STUDENT IN AN ORGANIZED HEALTH CARE EDUCATION/TRAINING PROGRAM

## 2024-12-13 PROCEDURE — 0DBK8ZX EXCISION OF ASCENDING COLON, VIA NATURAL OR ARTIFICIAL OPENING ENDOSCOPIC, DIAGNOSTIC: ICD-10-PCS | Performed by: STUDENT IN AN ORGANIZED HEALTH CARE EDUCATION/TRAINING PROGRAM

## 2024-12-13 PROCEDURE — 0DBL8ZX EXCISION OF TRANSVERSE COLON, VIA NATURAL OR ARTIFICIAL OPENING ENDOSCOPIC, DIAGNOSTIC: ICD-10-PCS | Performed by: STUDENT IN AN ORGANIZED HEALTH CARE EDUCATION/TRAINING PROGRAM

## 2024-12-13 PROCEDURE — 0DBM8ZX EXCISION OF DESCENDING COLON, VIA NATURAL OR ARTIFICIAL OPENING ENDOSCOPIC, DIAGNOSTIC: ICD-10-PCS | Performed by: STUDENT IN AN ORGANIZED HEALTH CARE EDUCATION/TRAINING PROGRAM

## 2024-12-13 RX ORDER — LIDOCAINE HYDROCHLORIDE 10 MG/ML
INJECTION, SOLUTION EPIDURAL; INFILTRATION; INTRACAUDAL; PERINEURAL AS NEEDED
Status: DISCONTINUED | OUTPATIENT
Start: 2024-12-13 | End: 2024-12-13 | Stop reason: SURG

## 2024-12-13 RX ORDER — SODIUM CHLORIDE, SODIUM LACTATE, POTASSIUM CHLORIDE, CALCIUM CHLORIDE 600; 310; 30; 20 MG/100ML; MG/100ML; MG/100ML; MG/100ML
INJECTION, SOLUTION INTRAVENOUS CONTINUOUS
Status: DISCONTINUED | OUTPATIENT
Start: 2024-12-13 | End: 2024-12-13

## 2024-12-13 RX ORDER — PHENYLEPHRINE HCL 10 MG/ML
VIAL (ML) INJECTION AS NEEDED
Status: DISCONTINUED | OUTPATIENT
Start: 2024-12-13 | End: 2024-12-13 | Stop reason: SURG

## 2024-12-13 RX ORDER — NALOXONE HYDROCHLORIDE 0.4 MG/ML
0.08 INJECTION, SOLUTION INTRAMUSCULAR; INTRAVENOUS; SUBCUTANEOUS ONCE AS NEEDED
Status: DISCONTINUED | OUTPATIENT
Start: 2024-12-13 | End: 2024-12-13

## 2024-12-13 RX ORDER — EPHEDRINE SULFATE 50 MG/ML
INJECTION INTRAVENOUS AS NEEDED
Status: DISCONTINUED | OUTPATIENT
Start: 2024-12-13 | End: 2024-12-13 | Stop reason: SURG

## 2024-12-13 RX ADMIN — EPHEDRINE SULFATE 5 MG: 50 INJECTION INTRAVENOUS at 11:41:00

## 2024-12-13 RX ADMIN — EPHEDRINE SULFATE 5 MG: 50 INJECTION INTRAVENOUS at 11:57:00

## 2024-12-13 RX ADMIN — PHENYLEPHRINE HCL 100 MCG: 10 MG/ML VIAL (ML) INJECTION at 11:56:00

## 2024-12-13 RX ADMIN — LIDOCAINE HYDROCHLORIDE 40 MG: 10 INJECTION, SOLUTION EPIDURAL; INFILTRATION; INTRACAUDAL; PERINEURAL at 11:32:00

## 2024-12-13 RX ADMIN — PHENYLEPHRINE HCL 100 MCG: 10 MG/ML VIAL (ML) INJECTION at 11:51:00

## 2024-12-13 RX ADMIN — PHENYLEPHRINE HCL 100 MCG: 10 MG/ML VIAL (ML) INJECTION at 11:41:00

## 2024-12-13 RX ADMIN — SODIUM CHLORIDE, SODIUM LACTATE, POTASSIUM CHLORIDE, CALCIUM CHLORIDE: 600; 310; 30; 20 INJECTION, SOLUTION INTRAVENOUS at 11:26:00

## 2024-12-13 NOTE — OPERATIVE REPORT
COLONOSCOPY REPORT    Howard Schmid     1955 Age 69 year old   PCP Haily Rivera MD Endoscopist Campbell Wright MD       Date of procedure: 24    Procedure: Colonoscopy w/ cold snare polypectomy, cold forceps polypectomy    Pre-operative diagnosis: screening    Post-operative diagnosis: polyps, hemorrhoids    Medications: MAC    Withdrawal time: 16 minutes    Procedure:  Informed consent was obtained from the patient after the risks of the procedure were discussed, including but not limited to bleeding, perforation, aspiration, infection, or possibility of a missed lesion. After discussions of the risks/benefits and alternatives to this procedure, as well as the planned sedation, the patient was placed in the left lateral decubitus position and begun on continuous blood pressure pulse oximetry and EKG monitoring and this was maintained throughout the procedure. Once an adequate level of sedation was obtained a digital rectal exam was completed. Then the lubricated tip of the Pediatric Li[employer]o-HCLML-931 diagnostic video colonoscope was inserted and advanced without difficulty to the cecum using water immersion and CO2 insufflation technique. The cecum was identified by localizing the trifold, the appendix and the ileocecal valve. Withdrawal was begun with thorough washing and careful examination of the colonic walls and folds. A routine second examination of the cecum/ascending colon was performed. Photodocumentation was obtained. The bowel prep was good. Views of the colon were good with washing. I then carefully withdrew the instrument from the patient who tolerated the procedure well.     Complications: none.    Findings:   1. 4 polyps noted as follows:      A. 4 mm polyp in the ascending colon; sessile morphology; cold snare polypectomy performed, polyp retrieved.      B. TWO -- 2 mm polyps in the transverse colon; sessile morphology; cold forceps polypectomy performed, polyps retrieved.       C. 3 mm polyp in the descending colon; sessile morphology; cold snare polypectomy performed, polyp retrieved.    2. Diverticulosis: no large diverticula appreciated.    3. Ileocecal valve appeared healthy and normal.    4. The colonic mucosa throughout the colon showed normal vascular pattern, without evidence of angioectasias or inflammation.     5. A retroflexed view of the rectum revealed moderately sized internal hemorrhoids and hypertrophied anal papillae.    6. DANILO: normal rectal tone, no masses palpated.     Impression:   4 small polyps removed.  Hemorrhoids  Hypertrophied anal papillae.  The colon was otherwise normal with glistening mucosa and intact vascular pattern.    Recommend:  Await pathology. The interval for the next colonoscopy will be determined after reviewing pathology. If new signs or symptoms develop, colonoscopy may need to be repeated sooner.   High fiber diet.  Monitor for blood in the stool. If having more than just tinge of blood, call office or go to the ER.  Consider repeat based on pathology.    >>>If tissue was obtained and you have not received your pathology results either by phone or letter within 2 weeks, please call our office at [phone].    Specimens: polyps    Blood loss: <1 ml

## 2024-12-13 NOTE — DISCHARGE INSTRUCTIONS
Home Care Instructions for Colonoscopy with Sedation    Diet:  - Resume your regular diet as tolerated unless otherwise instructed.  - Start with light meals to minimize bloating.  - Do not drink alcohol today.    Medication:  - If you have questions about resuming your normal medications, please contact your Primary Care Physician.    Activities:  - Take it easy today. Do not return to work today.  - Do not drive today.  - Do not operate any machinery today (including kitchen equipment).    Colonoscopy:  - You may notice some rectal \"spotting\" (a little blood on the toilet tissue) for a day or two after the exam. This is normal.  - If you experience any rectal bleeding (not spotting), persistent tenderness or sharp severe abdominal pains, oral temperature over 100 degrees Fahrenheit, light-headedness or dizziness, or any other problems, contact your doctor.    **If unable to reach your doctor, please go to the [facility] Emergency Room**    - Your referring physician will receive a full report of your examination.  - If you do not hear from your doctor's office within two weeks of your biopsy, please call them for your results.    You may be able to see your laboratory results in [employer] between 4 and 7 business days.  In some cases, your physician may not have viewed the results before they are released to [employer].  If you have questions regarding your results contact the physician who ordered the test/exam by phone or via [employer] by choosing \"Ask a Medical Question.\"

## 2024-12-13 NOTE — ANESTHESIA PREPROCEDURE EVALUATION
Anesthesia PreOp Note    HPI:     Howard Schmid is a 69 year old male who presents for preoperative consultation requested by: Campbell Wright MD    Date of Surgery: 2024    Procedure(s):  COLONOSCOPY  Indication: Screening for colon cancer    Relevant Problems   No relevant active problems       NPO:                         History Review:  Patient Active Problem List    Diagnosis Date Noted    Atherosclerosis of abdominal aorta (HCC) 2021    CKD (chronic kidney disease) stage 3, GFR 30-59 ml/min (HCC) 2021    Vitamin D deficiency 04/15/2011    Senile cataract 2010    Hyperlipidemia 2010    Preglaucoma 2010    Essential hypertension 2010    Type II diabetes mellitus (HCC) 2007   REGION CALCIUM SCORE   2024     LEFT MAIN: 0   LEFT ANTERIOR DESCENDIN   CIRCUMFLEX: 1048   RIGHT CORONARY ARTERY:   899      TOTAL CALCIUM SCORE: 3280   ECHO 2024    1. Left ventricle: The cavity size was normal. Wall thickness was normal.      Systolic function was normal. The estimated ejection fraction was 60-65%,      by visual assessment. No diagnostic evidence for regional wall motion      abnormalities. Doppler parameters are consistent with abnormal left      ventricular relaxation - grade 1 diastolic dysfunction.   2. Left atrium: The left atrial volume was normal.   3. Aortic valve: There was mild calcification.   Impressions:  No previous study was available for comparison.   EKG   ECG Report     Interpretation     --------------------------  Sinus Rhythm  WITHIN NORMAL LIMITS  When compared with ECG of 2015 08:38:46  No change  Electronically signed on 2022 at 12:01 by Edward Patricia MD     Past Medical History:    Calculus of kidney    NO SURGERY    Cup to disc asymmetry    Cup Disc Asymmetry, OU    Diabetes (HCC)    High blood pressure    High cholesterol    Hypertension    Lipid screening    per [employer]    Type 2 diabetes mellitus (HCC)     Visual impairment    READING GLASSES       Past Surgical History:   Procedure Laterality Date    Excis pterygium      per [employer]:  \"Pterygium excision\"       Prescriptions Prior to Admission[1]  Current Medications and Prescriptions Ordered in Epic[2]    Allergies[3]    Family History   Problem Relation Age of Onset    Diabetes Cousin     No Known Problems Son     Glaucoma Neg         No glaucoma history     Social History     Socioeconomic History    Marital status:    Tobacco Use    Smoking status: Former     Passive exposure: Past    Smokeless tobacco: Never   Vaping Use    Vaping status: Never Used   Substance and Sexual Activity    Alcohol use: Yes     Comment: (Beer) 3 beers occasionally    Drug use: No   Other Topics Concern    Caffeine Concern Yes     Comment: Coffee, 1-2 cups daily       Available pre-op labs reviewed.             Vital Signs:  Body mass index is 26.7 kg/m².   height is 1.575 m (5' 2\") and weight is 66.2 kg (146 lb).   Vitals:    12/11/24 1647   Weight: 66.2 kg (146 lb)   Height: 1.575 m (5' 2\")        Anesthesia Evaluation      Airway   Mallampati: II  TM distance: >3 FB  Neck ROM: full  Dental - Dentition appears grossly intact   (+) upper dentures and lower dentures    Pulmonary - normal exam   Cardiovascular - normal exam  (+) hypertension, CAD    Neuro/Psych      GI/Hepatic/Renal    (+) chronic renal disease CRI    Endo/Other    (+) diabetes mellitus  Abdominal                  Anesthesia Plan:   ASA:  3  Plan:   MAC  Informed Consent Plan and Risks Discussed With:  Patient and spouse      I have informed Howard Schmid and/or legal guardian or family member of the nature of the anesthetic plan, benefits, risks including possible dental damage if relevant, major complications, and any alternative forms of anesthetic management.   All of the patient's questions were answered to the best of my ability. The patient desires the anesthetic management as planned.  Yessy Cohen,  MD  12/13/2024 10:24 AM  Present on Admission:  **None**           [1]   Medications Prior to Admission   Medication Sig Dispense Refill Last Dose/Taking    atorvastatin 20 MG Oral Tab TAKE 1 TABLET DAILY 90 tablet 3 Taking    zolpidem 5 MG Oral Tab Take 1 tablet (5 mg total) by mouth nightly as needed for Sleep. 30 tablet 0 Taking As Needed    metoprolol succinate ER 50 MG Oral Tablet 24 Hr Take 1 tablet (50 mg total) by mouth in the morning and 1 tablet (50 mg total) before bedtime. 180 tablet 3 Taking    magnesium 30 MG Oral Tab Take 1 tablet (30 mg total) by mouth 2 (two) times daily.   Taking    glipiZIDE 5 MG Oral Tab Take 1 tablet (5 mg total) by mouth daily with dinner. 90 tablet 0 Taking    amLODIPine 5 MG Oral Tab Take 1 tablet (5 mg total) by mouth daily. 90 tablet 0 Taking    metFORMIN  MG Oral Tablet 24 Hr Take 2 tablets (1,000 mg total) by mouth 2 (two) times daily with meals. 360 tablet 3 Taking    losartan 100 MG Oral Tab TAKE 1 TABLET DAILY 90 tablet 3 Taking    aspirin 81 MG Oral Tab EC Take  by mouth.   Taking    Glucose Blood (FREESTYLE LITE TEST) In Vitro Strip Test once daily . 100 strip 3     polyethylene glycol, PEG 3350-KCl-NaBcb-NaCl-NaSulf, 236 g Oral Recon Soln Take 4,000 mL by mouth As Directed. Take 2,000 mL the night before your procedure and 2,000 mL the morning of your procedure. 1 each 0     FreeStyle Lancets Does not apply Misc Test once daily . 100 each 3     Blood Glucose Monitoring Suppl (FREESTYLE LITE) Does not apply Device Test once daily . 1 Device 0    [2]   No current Epic-ordered facility-administered medications on file.     No current Epic-ordered outpatient medications on file.   [3]   Allergies  Allergen Reactions    Chicken ITCHING    Shrimp ITCHING

## 2024-12-13 NOTE — H&P
History & Physical Examination    Patient Name: Howard Schmid  MRN: [id number]  CSN: [id number]  YOB: 1955    Diagnosis: screening for colon cancer    Prescriptions Prior to Admission[1]  No current facility-administered medications for this encounter.       Allergies: Allergies[2]    Past Medical History:    Calculus of kidney    NO SURGERY    Cup to disc asymmetry    Cup Disc Asymmetry, OU    Diabetes (HCC)    High blood pressure    High cholesterol    Hypertension    Lipid screening    per [employer]    Type 2 diabetes mellitus (HCC)    Visual impairment    READING GLASSES     Past Surgical History:   Procedure Laterality Date    Excis pterygium      per [employer]:  \"Pterygium excision\"     Family History   Problem Relation Age of Onset    Diabetes Cousin     No Known Problems Son     Glaucoma Neg         No glaucoma history     Social History     Tobacco Use    Smoking status: Former     Passive exposure: Past    Smokeless tobacco: Never   Substance Use Topics    Alcohol use: Yes     Comment: (Beer) 3 beers occasionally       SYSTEM Check if Review is Normal Check if Physical Exam is Normal If not normal, please explain:   HEENT [X ] [ X]    NECK  [X ] [ X]    HEART [X ] [ X]    LUNGS [X ] [ X]    ABDOMEN [X ] [ X]    EXTREMITIES [X ] [ X]    OTHER        I have discussed the risks and benefits and alternatives of the procedure with the patient/family.  They understand and agree to proceed with plan of care.   I have reviewed the History and Physical done within the last 30 days.  Any changes noted above.    Campbell Wright MD  [facility]                   [1]   Medications Prior to Admission   Medication Sig Dispense Refill Last Dose/Taking    atorvastatin 20 MG Oral Tab TAKE 1 TABLET DAILY 90 tablet 3 Taking    zolpidem 5 MG Oral Tab Take 1 tablet (5 mg total) by mouth nightly as needed for Sleep. 30 tablet 0 Taking As Needed    metoprolol succinate ER 50 MG Oral Tablet 24 Hr  Take 1 tablet (50 mg total) by mouth in the morning and 1 tablet (50 mg total) before bedtime. 180 tablet 3 Taking    magnesium 30 MG Oral Tab Take 1 tablet (30 mg total) by mouth 2 (two) times daily.   Taking    glipiZIDE 5 MG Oral Tab Take 1 tablet (5 mg total) by mouth daily with dinner. 90 tablet 0 Taking    amLODIPine 5 MG Oral Tab Take 1 tablet (5 mg total) by mouth daily. 90 tablet 0 Taking    metFORMIN  MG Oral Tablet 24 Hr Take 2 tablets (1,000 mg total) by mouth 2 (two) times daily with meals. 360 tablet 3 Taking    losartan 100 MG Oral Tab TAKE 1 TABLET DAILY 90 tablet 3 Taking    aspirin 81 MG Oral Tab EC Take  by mouth.   Taking    Glucose Blood (FREESTYLE LITE TEST) In Vitro Strip Test once daily . 100 strip 3     polyethylene glycol, PEG 3350-KCl-NaBcb-NaCl-NaSulf, 236 g Oral Recon Soln Take 4,000 mL by mouth As Directed. Take 2,000 mL the night before your procedure and 2,000 mL the morning of your procedure. 1 each 0     FreeStyle Lancets Does not apply Misc Test once daily . 100 each 3     Blood Glucose Monitoring Suppl (FREESTYLE LITE) Does not apply Device Test once daily . 1 Device 0    [2]   Allergies  Allergen Reactions    Chicken ITCHING    Shrimp ITCHING

## 2024-12-13 NOTE — ANESTHESIA POSTPROCEDURE EVALUATION
Patient: Howard Schmid    Procedure Summary       Date: 12/13/24 Room / Location: [facility] ENDOSCOPY 04 / EM ENDOSCOPY    Anesthesia Start: 1126 Anesthesia Stop: 1203    Procedure: COLONOSCOPY Diagnosis:       Screening for colon cancer      (colon polyps, hemorrhoids)    Surgeons: Campbell Wright MD Anesthesiologist: Yessy Cohen MD    Anesthesia Type: MAC ASA Status: 3            Anesthesia Type: MAC    Vitals Value Taken Time   /76 12/13/24 1205   Temp 36.5 12/13/24 1205   Pulse 75 12/13/24 1205   Resp 17 12/13/24 1205   SpO2 99 % 12/13/24 1205       [facility] AN Post Evaluation:   Patient Evaluated in PACU  Patient Participation: complete - patient participated  Level of Consciousness: awake  Pain Management: adequate  Airway Patency:patent  Yes    Nausea/Vomiting: none  Cardiovascular Status: hemodynamically stable  Respiratory Status: acceptable  Postoperative Hydration stable      Yessy Cohen MD  12/13/2024 12:05 PM

## 2024-12-16 NOTE — PROGRESS NOTES
1 adenoma removed on recent colonoscopy, other polyps were hyperplastic.     Repeat can be considered in 7 years.    Mychart sent.

## 2024-12-24 ENCOUNTER — TELEPHONE (OUTPATIENT)
Dept: INTERNAL MEDICINE CLINIC | Facility: CLINIC | Age: 69
End: 2024-12-24

## 2024-12-24 NOTE — TELEPHONE ENCOUNTER
Patient spouse called and said she has been receiving messages for patient to schedule with cardiology but patient already has appointment for 1/21/2024 with . Just wanted to let dr marrero

## 2025-01-03 RX ORDER — LOSARTAN POTASSIUM 100 MG/1
TABLET ORAL
Qty: 90 TABLET | Refills: 1 | Status: SHIPPED | OUTPATIENT
Start: 2025-01-03

## 2025-01-03 NOTE — TELEPHONE ENCOUNTER
Refill passed per [facility] protocol.    Requested Prescriptions   Pending Prescriptions Disp Refills    LOSARTAN 100 MG Oral Tab [Pharmacy Med Name: LOSARTAN TABS 100MG] 90 tablet 3     Sig: TAKE 1 TABLET DAILY       Hypertension Medications Protocol Passed - 1/3/2025  3:01 PM        Passed - CMP or BMP in past 12 months        Passed - Last BP reading less than 140/90     BP Readings from Last 1 Encounters:   12/13/24 128/72               Passed - In person appointment or virtual visit in the past 12 mos or appointment in next 3 mos     Recent Outpatient Visits              5 months ago Colon cancer screening    [facility], Campbell Cutler MD    Office Visit    5 months ago Uncontrolled type 2 diabetes mellitus with hyperglycemia (HCC)    [facility]    Office Visit    8 months ago Essential hypertension    [facility], Haily Reed MD    Office Visit    9 months ago Type 2 diabetes mellitus with other specified complication, without long-term current use of insulin (HCC)    [facility]    Nurse Only    9 months ago Medicare annual wellness visit, subsequent    [facility] Maelen, [facility]    Office Visit          Future Appointments         Provider Department Appt Notes    In 5 days Haily Rivera MD [facility] f/u                    Passed - EGFRCR or GFRNAA > 50     GFR Evaluation  EGFRCR: 54 , resulted on 8/13/2024

## 2025-01-08 ENCOUNTER — OFFICE VISIT (OUTPATIENT)
Dept: INTERNAL MEDICINE CLINIC | Facility: CLINIC | Age: 70
End: 2025-01-08

## 2025-01-08 VITALS
HEIGHT: 62 IN | SYSTOLIC BLOOD PRESSURE: 137 MMHG | DIASTOLIC BLOOD PRESSURE: 70 MMHG | HEART RATE: 77 BPM | WEIGHT: 141 LBS | BODY MASS INDEX: 25.95 KG/M2

## 2025-01-08 DIAGNOSIS — Z00.00 MEDICARE ANNUAL WELLNESS VISIT, SUBSEQUENT: Primary | ICD-10-CM

## 2025-01-08 DIAGNOSIS — N18.31 STAGE 3A CHRONIC KIDNEY DISEASE (HCC): ICD-10-CM

## 2025-01-08 DIAGNOSIS — R93.1 ELEVATED CORONARY ARTERY CALCIUM SCORE: ICD-10-CM

## 2025-01-08 DIAGNOSIS — E78.5 HYPERLIPIDEMIA, UNSPECIFIED HYPERLIPIDEMIA TYPE: ICD-10-CM

## 2025-01-08 DIAGNOSIS — E55.9 VITAMIN D DEFICIENCY: ICD-10-CM

## 2025-01-08 DIAGNOSIS — I70.0 ATHEROSCLEROSIS OF ABDOMINAL AORTA (HCC): ICD-10-CM

## 2025-01-08 DIAGNOSIS — E11.69 TYPE 2 DIABETES MELLITUS WITH OTHER SPECIFIED COMPLICATION, WITHOUT LONG-TERM CURRENT USE OF INSULIN (HCC): ICD-10-CM

## 2025-01-08 DIAGNOSIS — I10 ESSENTIAL HYPERTENSION: ICD-10-CM

## 2025-01-08 RX ORDER — AMLODIPINE BESYLATE 5 MG/1
5 TABLET ORAL DAILY
Qty: 90 TABLET | Refills: 3 | Status: SHIPPED | OUTPATIENT
Start: 2025-01-08

## 2025-01-08 RX ORDER — GLIPIZIDE 5 MG/1
5 TABLET ORAL
Qty: 90 TABLET | Refills: 3 | Status: SHIPPED | OUTPATIENT
Start: 2025-01-08

## 2025-01-08 RX ORDER — ATORVASTATIN CALCIUM 40 MG/1
TABLET, FILM COATED ORAL
COMMUNITY
Start: 2025-01-03

## 2025-01-10 ENCOUNTER — LAB ENCOUNTER (OUTPATIENT)
Dept: LAB | Age: 70
End: 2025-01-10
Attending: INTERNAL MEDICINE
Payer: MEDICARE

## 2025-01-10 DIAGNOSIS — E11.69 TYPE 2 DIABETES MELLITUS WITH OTHER SPECIFIED COMPLICATION, WITHOUT LONG-TERM CURRENT USE OF INSULIN (HCC): ICD-10-CM

## 2025-01-10 DIAGNOSIS — E78.5 HYPERLIPIDEMIA, UNSPECIFIED HYPERLIPIDEMIA TYPE: ICD-10-CM

## 2025-01-10 DIAGNOSIS — N18.31 STAGE 3A CHRONIC KIDNEY DISEASE (HCC): ICD-10-CM

## 2025-01-10 LAB
ALBUMIN SERPL-MCNC: 4.7 G/DL (ref 3.2–4.8)
ALBUMIN/GLOB SERPL: 1.8 {RATIO} (ref 1–2)
ALP LIVER SERPL-CCNC: 53 U/L
ALT SERPL-CCNC: 25 U/L
ANION GAP SERPL CALC-SCNC: 11 MMOL/L (ref 0–18)
AST SERPL-CCNC: 23 U/L (ref ?–34)
BASOPHILS # BLD AUTO: 0.05 X10(3) UL (ref 0–0.2)
BASOPHILS NFR BLD AUTO: 0.7 %
BILIRUB SERPL-MCNC: 1 MG/DL (ref 0.2–1.1)
BILIRUB UR QL: NEGATIVE
BUN BLD-MCNC: 25 MG/DL (ref 9–23)
BUN/CREAT SERPL: 17.4 (ref 10–20)
CALCIUM BLD-MCNC: 10.5 MG/DL (ref 8.7–10.4)
CHLORIDE SERPL-SCNC: 103 MMOL/L (ref 98–112)
CLARITY UR: CLEAR
CO2 SERPL-SCNC: 26 MMOL/L (ref 21–32)
CREAT BLD-MCNC: 1.44 MG/DL
DEPRECATED RDW RBC AUTO: 43.6 FL (ref 35.1–46.3)
EGFRCR SERPLBLD CKD-EPI 2021: 52 ML/MIN/1.73M2 (ref 60–?)
EOSINOPHIL # BLD AUTO: 0.55 X10(3) UL (ref 0–0.7)
EOSINOPHIL NFR BLD AUTO: 7.7 %
ERYTHROCYTE [DISTWIDTH] IN BLOOD BY AUTOMATED COUNT: 13.2 % (ref 11–15)
EST. AVERAGE GLUCOSE BLD GHB EST-MCNC: 180 MG/DL (ref 68–126)
FASTING STATUS PATIENT QL REPORTED: NO
GLOBULIN PLAS-MCNC: 2.6 G/DL (ref 2–3.5)
GLUCOSE BLD-MCNC: 124 MG/DL (ref 70–99)
GLUCOSE UR-MCNC: NORMAL MG/DL
HBA1C MFR BLD: 7.9 % (ref ?–5.7)
HCT VFR BLD AUTO: 44.2 %
HGB BLD-MCNC: 14.7 G/DL
HGB UR QL STRIP.AUTO: NEGATIVE
IMM GRANULOCYTES # BLD AUTO: 0.01 X10(3) UL (ref 0–1)
IMM GRANULOCYTES NFR BLD: 0.1 %
KETONES UR-MCNC: NEGATIVE MG/DL
LEUKOCYTE ESTERASE UR QL STRIP.AUTO: NEGATIVE
LYMPHOCYTES # BLD AUTO: 2.63 X10(3) UL (ref 1–4)
LYMPHOCYTES NFR BLD AUTO: 37 %
MCH RBC QN AUTO: 29.9 PG (ref 26–34)
MCHC RBC AUTO-ENTMCNC: 33.3 G/DL (ref 31–37)
MCV RBC AUTO: 90 FL
MONOCYTES # BLD AUTO: 0.55 X10(3) UL (ref 0.1–1)
MONOCYTES NFR BLD AUTO: 7.7 %
NEUTROPHILS # BLD AUTO: 3.32 X10 (3) UL (ref 1.5–7.7)
NEUTROPHILS # BLD AUTO: 3.32 X10(3) UL (ref 1.5–7.7)
NEUTROPHILS NFR BLD AUTO: 46.8 %
NITRITE UR QL STRIP.AUTO: NEGATIVE
OSMOLALITY SERPL CALC.SUM OF ELEC: 296 MOSM/KG (ref 275–295)
PH UR: 5.5 [PH] (ref 5–8)
PLATELET # BLD AUTO: 199 10(3)UL (ref 150–450)
POTASSIUM SERPL-SCNC: 4.9 MMOL/L (ref 3.5–5.1)
PROT SERPL-MCNC: 7.3 G/DL (ref 5.7–8.2)
PROT UR-MCNC: NEGATIVE MG/DL
RBC # BLD AUTO: 4.91 X10(6)UL
SODIUM SERPL-SCNC: 140 MMOL/L (ref 136–145)
SP GR UR STRIP: 1.01 (ref 1–1.03)
T4 FREE SERPL-MCNC: 1.6 NG/DL (ref 0.8–1.7)
TSI SER-ACNC: 0.42 UIU/ML (ref 0.55–4.78)
UROBILINOGEN UR STRIP-ACNC: NORMAL
WBC # BLD AUTO: 7.1 X10(3) UL (ref 4–11)

## 2025-01-10 PROCEDURE — 85025 COMPLETE CBC W/AUTO DIFF WBC: CPT

## 2025-01-10 PROCEDURE — 84439 ASSAY OF FREE THYROXINE: CPT

## 2025-01-10 PROCEDURE — 36415 COLL VENOUS BLD VENIPUNCTURE: CPT

## 2025-01-10 PROCEDURE — 83036 HEMOGLOBIN GLYCOSYLATED A1C: CPT

## 2025-01-10 PROCEDURE — 80053 COMPREHEN METABOLIC PANEL: CPT

## 2025-01-10 PROCEDURE — 81003 URINALYSIS AUTO W/O SCOPE: CPT

## 2025-01-10 PROCEDURE — 84443 ASSAY THYROID STIM HORMONE: CPT

## 2025-01-13 DIAGNOSIS — G47.00 INSOMNIA, UNSPECIFIED TYPE: ICD-10-CM

## 2025-01-14 ENCOUNTER — ORDER TRANSCRIPTION (OUTPATIENT)
Dept: ADMINISTRATIVE | Facility: HOSPITAL | Age: 70
End: 2025-01-14

## 2025-01-14 DIAGNOSIS — R06.02 SOB (SHORTNESS OF BREATH): ICD-10-CM

## 2025-01-14 DIAGNOSIS — R94.31 ABNORMAL ECG DURING EXERCISE STRESS TEST: Primary | ICD-10-CM

## 2025-01-14 DIAGNOSIS — G47.00 INSOMNIA, UNSPECIFIED TYPE: ICD-10-CM

## 2025-01-16 RX ORDER — ZOLPIDEM TARTRATE 5 MG/1
5 TABLET ORAL NIGHTLY PRN
Qty: 30 TABLET | Refills: 0 | OUTPATIENT
Start: 2025-01-16

## 2025-01-16 NOTE — TELEPHONE ENCOUNTER
Please review; protocol failed/No Protocol    Recent Fills: 09/25/2024    Last Rx Written: 09/25/2024    Last Office Visit: 01/08/2025    Requested Prescriptions   Pending Prescriptions Disp Refills    ZOLPIDEM 5 MG Oral Tab [Pharmacy Med Name: Zolpidem Tartrate 5 MG Oral Tablet] 30 tablet 0     Sig: TAKE 1 TABLET BY MOUTH NIGHTLY AS NEEDED FOR SLEEP       Controlled Substance Medication Failed - 1/16/2025 12:47 PM        Failed - This medication is a controlled substance - forward to provider to refill        Passed - Medication is active on med list           Future Appointments           Provider Department     In 1 week [facility] RN RADIOLOGY 3; [facility] CT RM1 CARD [facility] CT    Appt Notes: **OKD BY PAMELA      In 1 week [facility] CT RM4 FFR [facility] CT          Recent Outpatient Visits              1 week ago Essential hypertension    [facility], Haily Epperson MD    Office Visit    6 months ago Colon cancer screening    [facility], Campbell Cutler MD    Office Visit    6 months ago Uncontrolled type 2 diabetes mellitus with hyperglycemia (HCC)    [facility], Asher Breen APRN    Office Visit    8 months ago Essential hypertension    [facility], Haily Reed MD    Office Visit    10 months ago Type 2 diabetes mellitus with other specified complication, without long-term current use of insulin (HCC)    [facility]     Only

## 2025-01-17 RX ORDER — ZOLPIDEM TARTRATE 5 MG/1
5 TABLET ORAL NIGHTLY PRN
Qty: 30 TABLET | Refills: 0 | Status: SHIPPED | OUTPATIENT
Start: 2025-01-17

## 2025-01-20 PROBLEM — Z12.11 SCREENING FOR COLON CANCER: Status: RESOLVED | Noted: 2024-12-13 | Resolved: 2025-01-20

## 2025-01-21 NOTE — PROGRESS NOTES
Subjective:   Howard Schmid is a 69 year old male who presents for a MA AHA (Medicare Advantage Annual Health Assessment) and Subsequent Annual Wellness visit (Pt already had Initial Annual Wellness) and scheduled follow up of multiple significant but stable problems.   Elevated coronary score   At high risk for CAD   HTN diabetes  CKD       Headache no  dizziness        no                             Blurred vision no  palpitaionsSyncope no  Chest pain  no  PND  Orthopnea no  Weakness  No  Healthy diet  not all the time    yes    Compliance with exercise stays active  Compliance with medication yes                                              History/Other:   Fall Risk Assessment:   He has been screened for Falls and is low risk.      Cognitive Assessment:   He had a completely normal cognitive assessment - see flowsheet entries     Functional Ability/Status:   Howard Schmid has some abnormal functions as listed below:  He has Vision problems based on screening of functional status.       Depression Screening (PHQ):  PHQ-2 SCORE: 0  , done 1/8/2025            Advanced Directives:   He has a Living Will on file in [employer]; reviewed and discussed documents with patient (and family/surrogate if present).  He does have a POA but we do NOT have it on file in [employer].    Patient has Advance Care Planning documents but we do not have a copy in EMR. Discussed Advanced Care Planning with patient and instructed patient to get our office a copy to be scanned into EMR.      Patient Active Problem List   Diagnosis    Hyperlipidemia    Senile cataract    Vitamin D deficiency    Type II diabetes mellitus (HCC)    Essential hypertension    Atherosclerosis of abdominal aorta (HCC)    CKD (chronic kidney disease) stage 3, GFR 30-59 ml/min ([facility])    Preglaucoma    Screening for colon cancer     Allergies:  He is allergic to chicken and shrimp.    Current Medications:  Outpatient Medications Marked as Taking for the 1/8/25 encounter  (Office Visit) with Haily Rivera MD   Medication Sig    AMLODIPINE 5 MG Oral Tab TAKE 1 TABLET DAILY    GLIPIZIDE 5 MG Oral Tab TAKE 1 TABLET DAILY WITH DINNER    atorvastatin 40 MG Oral Tab     losartan 100 MG Oral Tab TAKE 1 TABLET DAILY    [DISCONTINUED] zolpidem 5 MG Oral Tab Take 1 tablet (5 mg total) by mouth nightly as needed for Sleep.    metoprolol succinate ER 50 MG Oral Tablet 24 Hr Take 1 tablet (50 mg total) by mouth in the morning and 1 tablet (50 mg total) before bedtime.    Glucose Blood (FREESTYLE LITE TEST) In Vitro Strip Test once daily .    magnesium 30 MG Oral Tab Take 1 tablet (30 mg total) by mouth 2 (two) times daily.    metFORMIN  MG Oral Tablet 24 Hr Take 2 tablets (1,000 mg total) by mouth 2 (two) times daily with meals.    FreeStyle Lancets Does not apply Misc Test once daily .    Blood Glucose Monitoring Suppl (FREESTYLE LITE) Does not apply Device Test once daily .    aspirin 81 MG Oral Tab EC Take  by mouth.       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  Surgical History:  He  has a past surgical history that includes excis pterygium and colonoscopy (N/A, 12/13/2024).   Family History:  His family history includes Diabetes in his cousin; No Known Problems in his son.  Social History:  He  reports that he has quit smoking. He has been exposed to tobacco smoke. He has never used smokeless tobacco. He reports current alcohol use. He reports that he does not use drugs.    Tobacco:  He smoked tobacco in the past but quit greater than 12 months ago.  Social History     Tobacco Use   Smoking Status Former    Passive exposure: Past   Smokeless Tobacco Never        CAGE Alcohol Screen:   CAGE screening score of 0 on 1/8/2025, showing low risk of alcohol  abuse.      Patient Care Team:  Haily Rivera MD as PCP - General (Internal Medicine)    Review of Systems   Constitutional: Negative.  Negative for appetite change, chills, fatigue and fever.   HENT:  Negative for congestion, ear discharge, ear pain, rhinorrhea, sinus pressure, sneezing, sore throat, trouble swallowing and voice change.    Eyes:  Negative for pain and discharge.   Respiratory:  Negative for cough, chest tightness, shortness of breath and wheezing.    Cardiovascular:  Negative for chest pain, palpitations and leg swelling.   Gastrointestinal:  Negative for abdominal distention, abdominal pain, blood in stool, constipation, diarrhea, nausea and vomiting.   Endocrine: Negative for cold intolerance and heat intolerance.   Genitourinary:  Negative for decreased urine volume, difficulty urinating, dysuria, frequency, hematuria and urgency.   Musculoskeletal:  Negative for arthralgias, back pain, gait problem and joint swelling.   Skin:  Negative for rash.   Allergic/Immunologic: Negative for environmental allergies and food allergies.   Neurological:  Negative for dizziness, tremors, seizures, weakness, light-headedness and headaches.   Hematological:  Negative for adenopathy. Does not bruise/bleed easily.   Psychiatric/Behavioral:  Negative for behavioral problems and confusion.           Objective:   Physical Exam  Constitutional:       Appearance: He is not ill-appearing.   HENT:      Right Ear: Ear canal normal.      Left Ear: Ear canal normal.      Mouth/Throat:      Pharynx: Oropharynx is clear.   Eyes:      Extraocular Movements: Extraocular movements intact.      Conjunctiva/sclera: Conjunctivae normal.      Pupils: Pupils are equal, round, and reactive to light.   Neck:      Vascular: No carotid bruit.   Cardiovascular:      Rate and Rhythm: Normal rate and regular rhythm.   Pulmonary:      Effort: No respiratory distress.      Breath sounds: Normal breath sounds. No wheezing.   Abdominal:       General: Bowel sounds are normal.      Palpations: Abdomen is soft.   Musculoskeletal:      Right lower leg: No edema.      Left lower leg: No edema.   Lymphadenopathy:      Cervical: No cervical adenopathy.   Skin:     General: Skin is warm and dry.   Neurological:      Mental Status: He is alert.   Psychiatric:         Mood and Affect: Mood normal.         /70 (BP Location: Left arm, Patient Position: Sitting, Cuff Size: adult)   Pulse 77   Ht 5' 2\" (1.575 m)   Wt 141 lb (64 kg)   BMI 25.79 kg/m²  Estimated body mass index is 25.79 kg/m² as calculated from the following:    Height as of this encounter: 5' 2\" (1.575 m).    Weight as of this encounter: 141 lb (64 kg).    Medicare Hearing Assessment:   Hearing Screening    Entry User: Shy Snaz CMA  Screening Method: Questionnaire  I have a problem hearing over the telephone: No I have trouble following the conversations when two or more people are talking at the same time: No   I have trouble understanding things on the TV: No I have to strain to understand conversations: No   I have to worry about missing the telephone ring or doorbell: No I have trouble hearing conversations in a noisy background such as a crowded room or restaurant: No   I get confused about where sounds come from: No I misunderstand some words in a sentence and need to ask people to repeat themselves: No   I especially have trouble understanding the speech of women and children: No I have trouble understanding the speaker in a large room such as at a meeting or place of Religious: No   Many people I talk to seem to mumble (or don't speak clearly): No People get annoyed because I misunderstand what they say: No   I misunderstand what others are saying and make inappropriate responses: No I avoid social activities because I cannot hear well and fear I will reply improperly: No   Family members and friends have told me they think I may have hearing loss: No                 Assessment  & Plan:   Howard Schmid is a 69 year old male who presents for a Medicare Assessment.   (Z00.00) Medicare annual wellness visit, subsequent  (primary encounter diagnosis)  Plan: Patient is independent with ADL.s    Health screening   Colonoscopy, prostate ca screen  And routine eye exam advised.      (I10) Essential hypertension  Plan: /70 (BP Location: Left arm, Patient Position: Sitting, Cuff Size: adult)   Pulse 77   Ht 5' 2\" (1.575 m)   Wt 141 lb (64 kg)   BMI 25.79 kg/m²   Controlled monitor    (E78.5) Hyperlipidemia, unspecified hyperlipidemia type  Plan: CBC With Differential With Platelet, Comp         Metabolic Panel (14), TSH and Free T4        Low cholesterol diet advised  Avoid saturated and trans fats       (N18.31) Stage 3a chronic kidney disease (HCC)  Plan: Urinalysis, Routine        Chronic monitor  Avoid nephrotoxic drugs monitor BP    (E55.9) Vitamin D deficiency  Plan: supplement    (E11.69) Type 2 diabetes mellitus with other specified complication, without long-term current use of insulin (HCC)  Plan: Hemoglobin A1C, OPHTHALMOLOGY - INTERNAL        HGBA1C:    Lab Results   Component Value Date    A1C 7.9 (H) 01/10/2025    A1C 7.1 (A) 07/08/2024    A1C 9.9 (H) 03/13/2024     (H) 01/10/2025     Uncontrolled  Importance of following dietary and lifestyle change  Limit carb intake    (I70.0) Atherosclerosis of abdominal aorta (HCC)  Plan: Asymptomatic  monitor      (R93.1) Elevated coronary artery calcium score  Plan: stress test scheduled by cardiology  Medications and most recent test results reviewed  Refill medicaitons  as needed  Potential side effect discussed  Modification of risk for CAD advised    Dietary an lifestyle change  Pt voiced understanding and agrees with plan  Pt given time to ask questions  After Visit Summary handout    Discussed  And given to patient.          The patient indicates understanding of these issues and agrees to the plan.  Reinforced healthy  diet, lifestyle, and exercise.      No follow-ups on file.     Haily Rivera MD, 1/20/2025     Supplementary Documentation:   General Health:  In the past six months, have you lost more than 10 pounds without trying?: 2 - No  Has your appetite been poor?: No  Type of Diet: Diabetic;Low Salt  How does the patient maintain a good energy level?: Daily Walks;Stretching  How would you describe your daily physical activity?: Moderate  How would you describe your current health state?: Fair  How do you maintain positive mental well-being?: Visiting Friends;Social Interaction  On a scale of 0 to 10, with 0 being no pain and 10 being severe pain, what is your pain level?: 0 - (None)  In the past six months, have you experienced urine leakage?: 0-No  At any time do you feel concerned for the safety/well-being of yourself and/or your children, in your home or elsewhere?: No  Have you had any immunizations at another office such as Influenza, Hepatitis B, Tetanus, or Pneumococcal?: No    Health Maintenance   Topic Date Due    Zoster Vaccines (1 of 2) Never done    Diabetes Care Dilated Eye Exam  03/31/2015    COVID-19 Vaccine (3 - 2024-25 season) 09/01/2024    Annual Well Visit  01/01/2025    Diabetes Care: Foot Exam (Annual)  01/01/2025    Diabetes Care: Microalb/Creat Ratio (Annual)  01/01/2025    Diabetes Care A1C  07/10/2025    Diabetes Care: GFR  01/10/2026    PSA  03/13/2026    Colorectal Cancer Screening  12/13/2034    Influenza Vaccine  Completed    Annual Depression Screening  Completed    Fall Risk Screening (Annual)  Completed    Pneumococcal Vaccine: 50+ Years  Completed    Meningococcal B Vaccine  Aged Out

## 2025-01-22 RX ORDER — METOPROLOL TARTRATE 50 MG
50 TABLET ORAL AS DIRECTED
COMMUNITY
Start: 2025-01-13

## 2025-01-24 ENCOUNTER — HOSPITAL ENCOUNTER (OUTPATIENT)
Dept: CT IMAGING | Facility: HOSPITAL | Age: 70
Discharge: HOME | End: 2025-01-24
Attending: INTERNAL MEDICINE
Payer: MEDICARE

## 2025-01-24 VITALS — WEIGHT: 144 LBS | HEIGHT: 62 IN | BODY MASS INDEX: 26.5 KG/M2

## 2025-01-24 DIAGNOSIS — R94.39 ABNORMAL STRESS TEST: ICD-10-CM

## 2025-01-24 DIAGNOSIS — R94.31 ABNORMAL EKG: ICD-10-CM

## 2025-01-24 PROCEDURE — 75574 CT ANGIO HRT W/3D IMAGE: CPT | Performed by: INTERNAL MEDICINE

## 2025-01-24 RX ORDER — DILTIAZEM HYDROCHLORIDE 5 MG/ML
5 INJECTION INTRAVENOUS SEE ADMIN INSTRUCTIONS
Status: DISCONTINUED | OUTPATIENT
Start: 2025-01-24 | End: 2025-01-26

## 2025-01-24 RX ORDER — METOPROLOL TARTRATE 25 MG/1
50 TABLET, FILM COATED ORAL ONCE AS NEEDED
Status: COMPLETED | OUTPATIENT
Start: 2025-01-24 | End: 2025-01-24

## 2025-01-24 RX ORDER — METOPROLOL TARTRATE 25 MG/1
TABLET, FILM COATED ORAL
Status: COMPLETED
Start: 2025-01-24 | End: 2025-01-24

## 2025-01-24 RX ORDER — NITROGLYCERIN 0.4 MG/1
0.4 TABLET SUBLINGUAL ONCE
Status: COMPLETED | OUTPATIENT
Start: 2025-01-24 | End: 2025-01-24

## 2025-01-24 RX ORDER — METOPROLOL TARTRATE 25 MG/1
100 TABLET, FILM COATED ORAL ONCE AS NEEDED
Status: COMPLETED | OUTPATIENT
Start: 2025-01-24 | End: 2025-01-24

## 2025-01-24 RX ORDER — METOPROLOL TARTRATE 1 MG/ML
INJECTION, SOLUTION INTRAVENOUS
Status: COMPLETED
Start: 2025-01-24 | End: 2025-01-24

## 2025-01-24 RX ORDER — DILTIAZEM HYDROCHLORIDE 5 MG/ML
INJECTION INTRAVENOUS
Status: COMPLETED
Start: 2025-01-24 | End: 2025-01-24

## 2025-01-24 RX ORDER — METOPROLOL TARTRATE 1 MG/ML
5 INJECTION, SOLUTION INTRAVENOUS SEE ADMIN INSTRUCTIONS
Status: DISCONTINUED | OUTPATIENT
Start: 2025-01-24 | End: 2025-01-26

## 2025-01-24 RX ADMIN — DILTIAZEM HYDROCHLORIDE 5 MG: 5 INJECTION INTRAVENOUS at 09:25:00

## 2025-01-24 RX ADMIN — METOPROLOL TARTRATE 100 MG: 25 TABLET, FILM COATED ORAL at 08:00:00

## 2025-01-24 RX ADMIN — NITROGLYCERIN 0.4 MG: 0.4 TABLET SUBLINGUAL at 09:57:00

## 2025-01-24 RX ADMIN — DILTIAZEM HYDROCHLORIDE 5 MG: 5 INJECTION INTRAVENOUS at 09:18:00

## 2025-01-24 RX ADMIN — METOPROLOL TARTRATE 100 MG: 25 TABLET, FILM COATED ORAL at 07:15:00

## 2025-01-24 RX ADMIN — METOPROLOL TARTRATE 5 MG: 1 INJECTION, SOLUTION INTRAVENOUS at 09:11:00

## 2025-01-24 RX ADMIN — METOPROLOL TARTRATE 5 MG: 1 INJECTION, SOLUTION INTRAVENOUS at 09:05:00

## 2025-01-24 RX ADMIN — METOPROLOL TARTRATE 5 MG: 1 INJECTION, SOLUTION INTRAVENOUS at 08:51:00

## 2025-01-24 RX ADMIN — METOPROLOL TARTRATE 5 MG: 1 INJECTION, SOLUTION INTRAVENOUS at 08:59:00

## 2025-01-24 RX ADMIN — DILTIAZEM HYDROCHLORIDE 5 MG: 5 INJECTION INTRAVENOUS at 09:34:00

## 2025-01-24 NOTE — IMAGING NOTE
TO RAD HOLDING AT 0707      HX TAKEN: ABNORMAL EKG    PT CONSENTED AT 0714     BASELINE VITAL SIGNS: HR 73  /79 BMI 26.3    CTA ORDERED BY DR TRIMBLE WAS PT GIVEN CTA PREMEDS: YES 50MG PO METOPROLOL X2 DOSES    METOPROLOL PO GIVEN 100 MILLIGRAMS  AT 0715    HR 70 /72 METOPROLOL PO GIVEN 100 MILLIGRAMS AT 0800    18 GAUGE IV STARTED AT 0825        GFR = 52   CREATINE = 1.44    0851: HR 68 /68 METOPROLOL 5 MILLIGRAMS GIVEN IV PUSH  SEE  PROTOCOL    0859: HR 67 /75 METOPROLOL 5 MILLIGRAMS GIVEN IV PUSH     0905: HR 65 /75 METOPROLOL 5 MILLIGRAMS  GIVEN IV PUSH    0911: HR 64 /70 METOPROLOL 5 MILLIGRAMS  GIVEN IV PUSH    0918: HR 63-65 /69 CARDIZEM 5 MILLIGRAMS  GIVEN IV PUSH     0925: HR 62 /64 CARDIZEM 5 MILLIGRAMS  GIVEN IV PUSH     0934: HR 59-60 /65 CARDIZEM 5 MILLIGRAMS  GIVEN IV PUSH     TO CT TABLE @ 0955    CONNECT TO MONITOR  HR 61 /79      NITROGLYCERIN 0.4 MILLIGRAMS SUBLINGUAL GIVEN AT 0957     CALCIUM SCORE COMPLETED AT 1000     INJECTION STARTED AT 1004 HR 64 DURING SCAN PROCEDURE COMPLETE    POST SCAN HR 74 /105 AT 1006    PT TO HOLDING AREA  VS: Q 15 MIN X2   /77 HR 67   /74 HR 63    AVS  PROVIDED      1027 DISCHARGED HOME

## 2025-04-16 NOTE — TELEPHONE ENCOUNTER
Please Review. Protocol Failed; No Protocol     Lab Results   Component Value Date     A1C 7.9 (H) 01/10/2025

## 2025-04-20 RX ORDER — METFORMIN HYDROCHLORIDE 500 MG/1
1000 TABLET, EXTENDED RELEASE ORAL 2 TIMES DAILY WITH MEALS
Qty: 360 TABLET | Refills: 3 | Status: SHIPPED | OUTPATIENT
Start: 2025-04-20

## 2025-06-27 RX ORDER — LOSARTAN POTASSIUM 100 MG/1
100 TABLET ORAL DAILY
Qty: 90 TABLET | Refills: 3 | Status: SHIPPED | OUTPATIENT
Start: 2025-06-27

## 2025-06-28 NOTE — TELEPHONE ENCOUNTER
Refill passed per [facility] protocol.      Requested Prescriptions   Pending Prescriptions Disp Refills    LOSARTAN 100 MG Oral Tab [Pharmacy Med Name: LOSARTAN TABS 100MG] 90 tablet 3     Sig: TAKE 1 TABLET DAILY       Hypertension Medications Protocol Passed - 6/27/2025  7:29 PM        Passed - CMP or BMP in past 12 months        Passed - Last BP reading less than 140/90     BP Readings from Last 1 Encounters:   01/08/25 137/70               Passed - In person appointment or virtual visit in the past 12 mos or appointment in next 3 mos     Recent Outpatient Visits              5 months ago Medicare annual wellness visit, subsequent    [facility], Haily Epperson MD    Office Visit    11 months ago Colon cancer screening    [facility]    Office Visit    11 months ago Uncontrolled type 2 diabetes mellitus with hyperglycemia (HCC)    [facility], sAher Breen APRN    Office Visit    1 year ago Essential hypertension    [facility], Haily Reed MD    Office Visit    1 year ago Type 2 diabetes mellitus with other specified complication, without long-term current use of insulin (HCC)    [facility], [place]    Nurse Only                      Passed - EGFRCR or GFRNAA > 50     GFR Evaluation  EGFRCR: 52 , resulted on 1/10/2025          Passed - Medication is active on med list                  Recent Outpatient Visits              5 months ago Medicare annual wellness visit, subsequent    [facility], Haily Epperson MD    Office Visit    11 months ago [facility] cancer [facility]    Office Visit    11 months ago Uncontrolled type 2 diabetes mellitus with hyperglycemia (HCC)     [facility]    Office Visit    1 year ago Essential hypertension    [facility], [facility], Haily Reed MD    Office Visit    1 year ago Type 2 diabetes mellitus with other specified complication, without long-term current use of insulin (HCC)    [facility] [place] [place]    Nurse Only

## 2025-08-12 DIAGNOSIS — G47.00 INSOMNIA, UNSPECIFIED TYPE: ICD-10-CM

## 2025-08-17 RX ORDER — ZOLPIDEM TARTRATE 5 MG/1
5 TABLET ORAL NIGHTLY PRN
Qty: 30 TABLET | Refills: 0 | Status: SHIPPED | OUTPATIENT
Start: 2025-08-17

## 2025-08-20 RX ORDER — METOPROLOL SUCCINATE 50 MG/1
50 TABLET, EXTENDED RELEASE ORAL 2 TIMES DAILY
Qty: 180 TABLET | Refills: 3 | Status: SHIPPED | OUTPATIENT
Start: 2025-08-20

## (undated) DEVICE — LASSO POLYPECTOMY SNARE: Brand: LASSO

## (undated) DEVICE — KIT ENDO ORCAPOD 160/180/190

## (undated) DEVICE — MEDI-VAC NON-CONDUCTIVE SUCTION TUBING 6MM X 1.8M (6FT.) L: Brand: CARDINAL HEALTH

## (undated) DEVICE — V2 SPECIMEN COLLECTION MANIFOLD KIT: Brand: NEPTUNE

## (undated) DEVICE — SINGLE-USE SNARE: Brand: CAPTIVATOR™ COLD

## (undated) DEVICE — KIT CLEAN ENDOKIT 1.1OZ GOWNX2

## (undated) DEVICE — 60 ML SYRINGE REGULAR TIP: Brand: MONOJECT

## (undated) DEVICE — GIJAW SINGLE-USE BIOPSY FORCEPS WITH NEEDLE: Brand: GIJAW

## (undated) DEVICE — CO2 CANNULA,SSOFT,ADLT,7O2,4CO2,FEMALE: Brand: MEDLINE

## (undated) NOTE — MR AVS SNAPSHOT
[address], [address]  [address]  [phone]               Thank you for choosing us for your health care visit with Rosalba Longo MD.  We are glad to serve you and happy to provide you with this summar Ophthalmology Referral - In Network    Complete by:  As directed    Assoc Dx:  Type 2 diabetes mellitus with other specified complication, without long-term current use of insulin ([facility] [E11.69]                 Referral Details     Referred By    Refer Allergies as of Mar 29, 2017     Chicken Itching    Shrimp Itching                Today's Vital Signs     BP Pulse Temp Height Weight BMI    128/84 mmHg 96 98 °F (36.7 °C) (Oral) 5' 2.5\" (1.588 m) 144 lb (65.318 kg) 25.90 kg/m2         Current Medications Make half your plate fruits and vegetables Highly refined, white starches including white bread, rice and pasta   Eat plenty of protein, keep the fat content low Sugars:  sodas and sports drinks, candies and desserts   Eat plenty of low-fat dairy products

## (undated) NOTE — LETTER
[facility]  [address] [facility] [address] [phone]     08/24/2023      Hello,     This is the [facility], office of Dr. Jesse Amador    Thank you for putting your trust in [place] [address]. Our goal is to deliver the highest quality healthcare and an exceptional patient experience. Upon reviewing of your medical record shows you are due for the following:          Annual Medicare Physical         Please call [phone] to schedule your appointment or schedule online via [employer]. If you changed to a new provider at another facility, please notify the clinic to update your records. If you had any recent testing at another facility, please have your results faxed to our office at [phone]. Thank you and have a great day!

## (undated) NOTE — LETTER
03/14/19        Howard Schmid  [address]  [address] [place] [address]      Dear Silvana Brasher,    Our records indicate that you have outstanding lab work and or testing that was ordered for you and has not yet been completed:  Orders Placed This Encounter

## (undated) NOTE — LETTER
[facility]  Administration of Anesthesia  Howard STINSON agree to be cared for by a physician anesthesiologist alone and/or with a nurse anesthetist, who is specially trained to monitor me and give me medicine to put me to sleep or keep me comfortable during my procedure    I understand that my anesthesiologist and/or anesthetist is not an employee or agent of [facility] or [employer]. He or she works for [facility]    As the patient asking for anesthesia services, I agree to:  Allow the anesthesiologist (anesthesia doctor) to give me medicine and do additional procedures as necessary. Some examples are: Starting or using an “IV” to give me medicine, fluids or blood during my procedure, and having a breathing tube placed to help me breathe when I’m asleep (intubation). In the event that my heart stops working properly, I understand that my anesthesiologist will make every effort to sustain my life, unless otherwise directed by [facility] Do Not Resuscitate documents.  Tell my anesthesia doctor before my procedure:  If I am pregnant.  The last time that I ate or drank.  iii. All of the medicines I take (including prescriptions, herbal supplements, and pills I can buy without a prescription (including street drugs/illegal medications). Failure to inform my anesthesiologist about these medicines may increase my risk of anesthetic complications.  iv.If I am allergic to anything or have had a reaction to anesthesia before.  I understand how the anesthesia medicine will help me (benefits).  I understand that with any type of anesthesia medicine there are risks:  The most common risks are: nausea, vomiting, sore throat, muscle soreness, damage to my eyes, mouth, or teeth (from breathing tube placement).  Rare risks include: remembering what happened during my procedure, allergic reactions to medications, injury to my airway, heart, lungs, vision, nerves, or  muscles and in extremely rare instances death.  My doctor has explained to me other choices available to me for my care (alternatives).  Pregnant Patients (“epidural”):  I understand that the risks of having an epidural (medicine given into my back to help control pain during labor), include itching, low blood pressure, difficulty urinating, headache or slowing of the baby’s heart. Very rare risks include infection, bleeding, seizure, irregular heart rhythms and nerve injury.  Regional Anesthesia (“spinal”, “epidural”, & “nerve blocks”):  I understand that rare but potential complications include headache, bleeding, infection, seizure, irregular heart rhythms, and nerve injury.    _____________________________________________________________________________  Patient (or Representative) Signature/Relationship to Patient  Date   Time    _____________________________________________________________________________   Name (if used)    Language/Organization   Time    _____________________________________________________________________________  Nurse Anesthetist Signature     Date   Time  _____________________________________________________________________________  Anesthesiologist Signature     Date   Time  I have discussed the procedure and information above with the patient (or patient’s representative) and answered their questions. The patient or their representative has agreed to have anesthesia services.    _____________________________________________________________________________  Witness        Date   Time  I have verified that the signature is that of the patient or patient’s representative, and that it was signed before the procedure  Patient Name: Howard Schmid     : 1955                 Printed: 2024 at 9:04 AM    Medical Record #: [id number]                                            Page 1 of 1  ----------ANESTHESIA CONSENT----------

## (undated) NOTE — MR AVS SNAPSHOT
[place] [facility]. [address] [place] [address]  [address]  [address]  [phone]               Thank you for choosing us for your health care visit with Sharona Bashir MD.  We are glad to serve you and happy to provide you with this summary of MyChart     Visit Pictorama  You can access your MyChart to more actively manage your health care and view more details from this visit by going to [email or web address].   If you've recently had a stay at the Hospital you can access your d ? Cover porch steps with gritty weather proof paint. ? Pay attention to curbs and other changes in surfaces when out in the community. ? Take care when walking on gravel or grassy surfaces. ? Avoid walking on snowy or icy surfaces. ?  Use a cane or walk

## (undated) NOTE — LETTER
[facility]  [address], [place], [place]    Authorization for Surgical Operation and Procedure                               I hereby authorize Campbell Wright MD, my physician and his/her assistants (if applicable), which may include medical students, residents, and/or fellows, to perform the following surgical operation/ procedure and administer such anesthesia as may be determined necessary by my physician: Operation/Procedure name (s) COLONOSCOPY on Howard S Sulit   2.   I recognize that during the surgical operation/procedure, unforeseen conditions may necessitate additional or different procedures than those listed above.  I, therefore, further authorize and request that the above-named surgeon, assistants, or designees perform such procedures as are, in their judgment, necessary and desirable.    3.   My surgeon/physician has discussed prior to my surgery the potential benefits, risks and side effects of this procedure; the likelihood of achieving goals; and potential problems that might occur during recuperation.  They also discussed reasonable alternatives to the procedure, including risks, benefits, and side effects related to the alternatives and risks related to not receiving this procedure.  I have had all my questions answered and I acknowledge that no guarantee has been made as to the result that may be obtained.    4.   Should the need arise during my operation/procedure, which includes change of level of care prior to discharge, I also consent to the administration of blood and/or blood products.  Further, I understand that despite careful testing and screening of blood or blood products by collecting agencies, I may still be subject to ill effects as a result of receiving a blood transfusion and/or blood products.  The following are some, but not all, of the potential risks that can occur: fever and allergic reactions, hemolytic reactions, transmission of diseases such  as Hepatitis, AIDS and Cytomegalovirus (CMV) and fluid overload.  In the event that I wish to have an autologous transfusion of my own blood, or a directed donor transfusion, I will discuss this with my physician.  Check only if Refusing Blood or Blood Products  I understand refusal of blood or blood products as deemed necessary by my physician may have serious consequences to my condition to include possible death. I hereby assume responsibility for my refusal and release the hospital, its personnel, and my physicians from any responsibility for the consequences of my refusal.    o  Refuse   5.   I authorize the use of any specimen, organs, tissues, body parts or foreign objects that may be removed from my body during the operation/procedure for diagnosis, research or teaching purposes and their subsequent disposal by hospital authorities.  I also authorize the release of specimen test results and/or written reports to my treating physician on the hospital medical staff or other referring or consulting physicians involved in my care, at the discretion of the Pathologist or my treating physician.    6.   I consent to the photographing or videotaping of the operations or procedures to be performed, including appropriate portions of my body for medical, scientific, or educational purposes, provided my identity is not revealed by the pictures or by descriptive texts accompanying them.  If the procedure has been photographed/videotaped, the surgeon will obtain the original picture, image, videotape or CD.  The hospital will not be responsible for storage, release or maintenance of the picture, image, tape or CD.    7.   I consent to the presence of a  or observers in the operating room as deemed necessary by my physician or their designees.    8.   I recognize that in the event my procedure results in extended X-Ray/fluoroscopy time, I may develop a skin reaction.    9. If I have a Do Not Attempt  Resuscitation (DNAR) order in place, that status will be suspended while in the operating room, procedural suite, and during the recovery period unless otherwise explicitly stated by me (or a person authorized to consent on my behalf). The surgeon or my attending physician will determine when the applicable recovery period ends for purposes of reinstating the DNAR order.  10. Patients having a sterilization procedure: I understand that if the procedure is successful the results will be permanent and it will therefore be impossible for me to inseminate, conceive, or bear children.  I also understand that the procedure is intended to result in sterility, although the result has not been guaranteed.   11. I acknowledge that my physician has explained sedation/analgesia administration to me including the risk and benefits I consent to the administration of sedation/analgesia as may be necessary or desirable in the judgment of my physician.    I CERTIFY THAT I HAVE READ AND FULLY UNDERSTAND THE ABOVE CONSENT TO OPERATION and/or OTHER PROCEDURE.     ____________________________________  _________________________________        ______________________________  Signature of Patient    Signature of Responsible Person                Printed Name of Responsible Person                                      ____________________________________  _____________________________                ________________________________  Signature of Witness        Date  Time         Relationship to Patient    STATEMENT OF PHYSICIAN My signature below affirms that prior to the time of the procedure; I have explained to the patient and/or his/her legal representative, the risks and benefits involved in the proposed treatment and any reasonable alternative to the proposed treatment. I have also explained the risks and benefits involved in refusal of the proposed treatment and alternatives to the proposed treatment and have answered the patient's  questions. If I have a significant financial interest in a co-management agreement or a significant financial interest in any product or implant, or other significant relationship used in this procedure/surgery, I have disclosed this and had a discussion with my patient.     _____________________________________________________              _____________________________  (Signature of Physician)                                                                                         (Date)                                   (Time)  Patient Name: Howard Schmid      : 1955      Printed: 2024     Medical Record #: [id number]                                      Page 1 of 1